# Patient Record
Sex: FEMALE | Race: WHITE | Employment: OTHER | URBAN - METROPOLITAN AREA
[De-identification: names, ages, dates, MRNs, and addresses within clinical notes are randomized per-mention and may not be internally consistent; named-entity substitution may affect disease eponyms.]

---

## 2017-01-10 ENCOUNTER — TELEPHONE (OUTPATIENT)
Dept: INTERNAL MEDICINE | Age: 62
End: 2017-01-10

## 2017-01-12 ENCOUNTER — OFFICE VISIT (OUTPATIENT)
Dept: INTERNAL MEDICINE | Age: 62
End: 2017-01-12

## 2017-01-12 VITALS — DIASTOLIC BLOOD PRESSURE: 90 MMHG | TEMPERATURE: 97.8 F | WEIGHT: 191 LBS | SYSTOLIC BLOOD PRESSURE: 170 MMHG

## 2017-01-12 DIAGNOSIS — R05.9 COUGH: Primary | ICD-10-CM

## 2017-01-12 DIAGNOSIS — J06.9 UPPER RESPIRATORY TRACT INFECTION, UNSPECIFIED TYPE: ICD-10-CM

## 2017-01-12 DIAGNOSIS — I10 ESSENTIAL HYPERTENSION: ICD-10-CM

## 2017-01-12 DIAGNOSIS — R07.9 CHEST PAIN, UNSPECIFIED TYPE: ICD-10-CM

## 2017-01-12 PROCEDURE — 93000 ELECTROCARDIOGRAM COMPLETE: CPT | Performed by: INTERNAL MEDICINE

## 2017-01-12 PROCEDURE — 99213 OFFICE O/P EST LOW 20 MIN: CPT | Performed by: INTERNAL MEDICINE

## 2017-01-12 RX ORDER — PROMETHAZINE HYDROCHLORIDE AND PHENYLEPHRINE HYDROCHLORIDE 6.25; 5 MG/5ML; MG/5ML
5 SYRUP ORAL EVERY 6 HOURS
Qty: 120 ML | Refills: 0 | Status: SHIPPED | OUTPATIENT
Start: 2017-01-12 | End: 2017-02-15 | Stop reason: ALTCHOICE

## 2017-01-12 RX ORDER — TRIAMTERENE AND HYDROCHLOROTHIAZIDE 37.5; 25 MG/1; MG/1
1 TABLET ORAL DAILY
Qty: 30 TABLET | Refills: 3 | Status: SHIPPED | OUTPATIENT
Start: 2017-01-12 | End: 2017-02-15

## 2017-01-12 ASSESSMENT — ENCOUNTER SYMPTOMS
GASTROINTESTINAL NEGATIVE: 1
SHORTNESS OF BREATH: 0
EYES NEGATIVE: 1
SORE THROAT: 1
COUGH: 1

## 2017-02-15 ENCOUNTER — OFFICE VISIT (OUTPATIENT)
Dept: INTERNAL MEDICINE | Age: 62
End: 2017-02-15

## 2017-02-15 VITALS — DIASTOLIC BLOOD PRESSURE: 78 MMHG | SYSTOLIC BLOOD PRESSURE: 130 MMHG | WEIGHT: 192 LBS

## 2017-02-15 DIAGNOSIS — I10 ESSENTIAL HYPERTENSION: Primary | ICD-10-CM

## 2017-02-15 DIAGNOSIS — G47.33 OBSTRUCTIVE SLEEP APNEA SYNDROME: ICD-10-CM

## 2017-02-15 PROCEDURE — 99213 OFFICE O/P EST LOW 20 MIN: CPT | Performed by: INTERNAL MEDICINE

## 2017-02-15 RX ORDER — LOSARTAN POTASSIUM AND HYDROCHLOROTHIAZIDE 12.5; 5 MG/1; MG/1
0.5 TABLET ORAL DAILY
Qty: 30 TABLET | Refills: 3 | Status: SHIPPED | OUTPATIENT
Start: 2017-02-15 | End: 2017-03-20 | Stop reason: SDUPTHER

## 2017-02-15 ASSESSMENT — ENCOUNTER SYMPTOMS
CHEST TIGHTNESS: 0
VOMITING: 0
SHORTNESS OF BREATH: 0
ABDOMINAL PAIN: 0
DIARRHEA: 0
COUGH: 0
NAUSEA: 0
CONSTIPATION: 0

## 2017-03-20 ENCOUNTER — TELEPHONE (OUTPATIENT)
Dept: INTERNAL MEDICINE | Age: 62
End: 2017-03-20

## 2017-03-20 RX ORDER — LOSARTAN POTASSIUM AND HYDROCHLOROTHIAZIDE 12.5; 5 MG/1; MG/1
1 TABLET ORAL DAILY
Qty: 30 TABLET | Refills: 5 | COMMUNITY
Start: 2017-03-20 | End: 2017-04-25 | Stop reason: SDUPTHER

## 2017-04-25 RX ORDER — LOSARTAN POTASSIUM AND HYDROCHLOROTHIAZIDE 12.5; 5 MG/1; MG/1
1 TABLET ORAL DAILY
Qty: 90 TABLET | Refills: 3 | Status: SHIPPED | OUTPATIENT
Start: 2017-04-25 | End: 2017-12-27 | Stop reason: SDUPTHER

## 2017-09-26 ENCOUNTER — TELEPHONE (OUTPATIENT)
Dept: INTERNAL MEDICINE | Age: 62
End: 2017-09-26

## 2017-09-26 DIAGNOSIS — Z12.31 VISIT FOR SCREENING MAMMOGRAM: Primary | ICD-10-CM

## 2017-09-26 RX ORDER — ESCITALOPRAM OXALATE 10 MG/1
TABLET ORAL
Qty: 90 TABLET | Refills: 3 | Status: SHIPPED | OUTPATIENT
Start: 2017-09-26 | End: 2017-12-27 | Stop reason: SDUPTHER

## 2017-11-22 ENCOUNTER — TELEPHONE (OUTPATIENT)
Dept: INTERNAL MEDICINE | Age: 62
End: 2017-11-22

## 2017-11-22 RX ORDER — SCOLOPAMINE TRANSDERMAL SYSTEM 1 MG/1
1 PATCH, EXTENDED RELEASE TRANSDERMAL
Qty: 5 PATCH | Refills: 0 | Status: SHIPPED | OUTPATIENT
Start: 2017-11-22 | End: 2017-11-22 | Stop reason: SDUPTHER

## 2017-11-22 RX ORDER — SCOLOPAMINE TRANSDERMAL SYSTEM 1 MG/1
1 PATCH, EXTENDED RELEASE TRANSDERMAL
Qty: 5 PATCH | Refills: 0 | Status: SHIPPED | OUTPATIENT
Start: 2017-11-22 | End: 2017-12-27 | Stop reason: CLARIF

## 2017-11-22 NOTE — TELEPHONE ENCOUNTER
Patient's  Clay Jeong called the patient will traveling to [facility] in 2 weeks and she's asking for see sick patch   [facility] on file   Please call pt  when completed

## 2017-11-22 NOTE — TELEPHONE ENCOUNTER
Pt's  asking for scopolamine (TRANSDERM-SCOP, 1.5 MG,) transdermal patch be sent to [facility] [phone] since [facility] does not have

## 2017-12-27 ENCOUNTER — OFFICE VISIT (OUTPATIENT)
Dept: INTERNAL MEDICINE | Age: 62
End: 2017-12-27

## 2017-12-27 VITALS
WEIGHT: 196 LBS | DIASTOLIC BLOOD PRESSURE: 80 MMHG | HEIGHT: 62 IN | SYSTOLIC BLOOD PRESSURE: 130 MMHG | BODY MASS INDEX: 36.07 KG/M2

## 2017-12-27 DIAGNOSIS — E78.5 HYPERLIPIDEMIA, UNSPECIFIED HYPERLIPIDEMIA TYPE: ICD-10-CM

## 2017-12-27 DIAGNOSIS — I10 ESSENTIAL HYPERTENSION: ICD-10-CM

## 2017-12-27 DIAGNOSIS — Z12.11 ENCOUNTER FOR SCREENING COLONOSCOPY: ICD-10-CM

## 2017-12-27 DIAGNOSIS — G47.33 OBSTRUCTIVE SLEEP APNEA SYNDROME: ICD-10-CM

## 2017-12-27 DIAGNOSIS — Z00.00 WELL ADULT EXAM: Primary | ICD-10-CM

## 2017-12-27 DIAGNOSIS — F32.A DEPRESSION, UNSPECIFIED DEPRESSION TYPE: ICD-10-CM

## 2017-12-27 PROCEDURE — 93000 ELECTROCARDIOGRAM COMPLETE: CPT | Performed by: INTERNAL MEDICINE

## 2017-12-27 PROCEDURE — 99396 PREV VISIT EST AGE 40-64: CPT | Performed by: INTERNAL MEDICINE

## 2017-12-27 RX ORDER — LOSARTAN POTASSIUM AND HYDROCHLOROTHIAZIDE 12.5; 5 MG/1; MG/1
1 TABLET ORAL DAILY
Qty: 90 TABLET | Refills: 3 | Status: SHIPPED | OUTPATIENT
Start: 2017-12-27 | End: 2019-03-06 | Stop reason: SDUPTHER

## 2017-12-27 RX ORDER — ESCITALOPRAM OXALATE 10 MG/1
TABLET ORAL
Qty: 90 TABLET | Refills: 3 | Status: SHIPPED | OUTPATIENT
Start: 2017-12-27 | End: 2019-03-06 | Stop reason: SDUPTHER

## 2017-12-27 ASSESSMENT — PATIENT HEALTH QUESTIONNAIRE - PHQ9
SUM OF ALL RESPONSES TO PHQ QUESTIONS 1-9: 0
1. LITTLE INTEREST OR PLEASURE IN DOING THINGS: 0
SUM OF ALL RESPONSES TO PHQ9 QUESTIONS 1 & 2: 0
2. FEELING DOWN, DEPRESSED OR HOPELESS: 0

## 2017-12-27 NOTE — PROGRESS NOTES
Annual Wellness Visit     Patient:  Carlota Raines                                               : 1955  Age: 65 y.o. MRN: [id number]  Date : 2017      CHIEF COMPLAINT: Carlota Raines is a 65 y.o. female who presents for : Physical exam    1. Well adult exam  Gen. he feels okay does have a problem with pain in her knee denies any chest pain shortness of breath or any other problems  - EKG 12 lead  - CBC Auto Differential  - Comprehensive Metabolic Panel  - Lipid Panel  - TSH without Reflex  - Urinalysis    2. Essential hypertension  Problem is stable  - EKG 12 lead  - CBC Auto Differential  - Comprehensive Metabolic Panel  - Lipid Panel  - TSH without Reflex  - Urinalysis    3. Hyperlipidemia, unspecified hyperlipidemia type  From is stable no myalgias follows her diet  - EKG 12 lead  - CBC Auto Differential  - Comprehensive Metabolic Panel  - Lipid Panel  - TSH without Reflex  - Urinalysis    4. Depression, unspecified depression type  Problem is stable on present meds    5. Obstructive sleep apnea syndrome  Is to wear CPAP    6.  Encounter for screening colonoscopy    - Corey Frankel MD (formerly [facility])        Patient Active Problem List    Diagnosis Date Noted    Essential hypertension 2017    EKG abnormalities 2016    Well adult exam 2014    CTS (carpal tunnel syndrome) 2014    Premature ovarian failure 2014    Depression 2013    HTN (hypertension) 2013    Sleep apnea 2013    Hyperlipidemia 2013       Constitutional:  Denies fever or chills   Eyes:  Denies change in visual acuity   HENT:  Denies nasal congestion or sore throat   Respiratory:  Denies cough or shortness of breath   Cardiovascular:  Denies chest pain or edema   GI:  Denies abdominal pain, nausea, vomiting, bloody stools or diarrhea   :  Denies dysuria   Musculoskeletal:  Denies back pain or joint pain   Integument:  Denies rash   Neurologic:  Denies headache, focal weakness or sensory changes   Endocrine:  Denies polyuria or polydipsia   Lymphatic:  Denies swollen glands   Psychiatric:  Denies depression or anxiety     Past Medical History:        Diagnosis Date    CTS (carpal tunnel syndrome) 5/29/2014    Depression 1/31/2013    EKG abnormalities 8/30/2016    HTN (hypertension) 1/31/2013    Premature ovarian failure 5/29/2014    Sleep apnea 1/31/2013       Past Surgical History:        Procedure Laterality Date    CARPAL TUNNEL RELEASE      COLONOSCOPY           Allergies:  Bee venom and Pcn [penicillins]    Current Medications:    Prior to Admission medications    Medication Sig Start Date End Date Taking? Authorizing Provider   escitalopram (LEXAPRO) 10 MG tablet TAKE 1 TABLET DAILY 12/27/17  Yes Lindsay Oconnor MD   losartan-hydrochlorothiazide [facility]) 50-12.5 MG per tablet Take 1 tablet by mouth daily 12/27/17  Yes Lindsay Oconnor MD   vitamin D (CHOLECALCIFEROL) 1000 UNIT TABS tablet Take 1,000 Units by mouth daily   Yes Historical Provider, MD   calcium carbonate (OSCAL) 500 MG TABS tablet Take 500 mg by mouth daily   Yes Historical Provider, MD   ascorbic acid (VITAMIN C) 500 MG tablet Take 1,000 mg by mouth daily   Yes Historical Provider, MD           Physical Exam:      Constitutional:  Well developed, well nourished, no acute distress, non-toxic appearance   Eyes:  PERRL, conjunctiva normal   HENT:  Atraumatic, external ears normal, nose normal, oropharynx moist, no pharyngeal exudates. Neck- normal range of motion, no tenderness, supple   Respiratory:  No respiratory distress, normal breath sounds, no rales, no wheezing   Cardiovascular:  Normal rate, normal rhythm, no murmurs, no gallops, no rubs   GI:  Soft, nondistended, normal bowel sounds, nontender, no organomegaly, no mass, no rebound, no guarding   :  No costovertebral angle tenderness   Musculoskeletal:  No edema, no tenderness, no deformities.  Back- no tenderness  Integument:  Well hydrated, no rash Comprehensive Metabolic Panel  - Lipid Panel  - TSH without Reflex  - Urinalysis    3. Hyperlipidemia, unspecified hyperlipidemia type  Check above labs continue present meds for now follow diet  - EKG 12 lead  - CBC Auto Differential  - Comprehensive Metabolic Panel  - Lipid Panel  - TSH without Reflex  - Urinalysis    4. Depression, unspecified depression type  Problem is stable on current meds    5. Obstructive sleep apnea syndrome  Problem is stable continue CPAP    6.  Encounter for screening colonoscopy  Referral for screening colonoscopy  - Sophia Dakins MD ([facility])   Patient was also given exercise program for chondromalacia of the left knee

## 2018-01-03 LAB
A/G RATIO: 2.1 (ref 1.1–2.2)
ALBUMIN SERPL-MCNC: 4.6 G/DL (ref 3.4–5)
ALP BLD-CCNC: 69 U/L (ref 40–129)
ALT SERPL-CCNC: 33 U/L (ref 10–40)
ANION GAP SERPL CALCULATED.3IONS-SCNC: 13 MMOL/L (ref 3–16)
AST SERPL-CCNC: 40 U/L (ref 15–37)
BASOPHILS ABSOLUTE: 0 K/UL (ref 0–0.2)
BASOPHILS RELATIVE PERCENT: 0.7 %
BILIRUB SERPL-MCNC: 0.3 MG/DL (ref 0–1)
BILIRUBIN URINE: NEGATIVE
BLOOD, URINE: NEGATIVE
BUN BLDV-MCNC: 14 MG/DL (ref 7–20)
CALCIUM SERPL-MCNC: 9.9 MG/DL (ref 8.3–10.6)
CHLORIDE BLD-SCNC: 98 MMOL/L (ref 99–110)
CHOLESTEROL, TOTAL: 186 MG/DL (ref 0–199)
CLARITY: CLEAR
CO2: 30 MMOL/L (ref 21–32)
COLOR: YELLOW
CREAT SERPL-MCNC: 0.9 MG/DL (ref 0.6–1.2)
EOSINOPHILS ABSOLUTE: 0.3 K/UL (ref 0–0.6)
EOSINOPHILS RELATIVE PERCENT: 3.9 %
EPITHELIAL CELLS, UA: 7 /HPF (ref 0–5)
GFR AFRICAN AMERICAN: >60
GFR NON-AFRICAN AMERICAN: >60
GLOBULIN: 2.2 G/DL
GLUCOSE BLD-MCNC: 113 MG/DL (ref 70–99)
GLUCOSE URINE: NEGATIVE MG/DL
HCT VFR BLD CALC: 41.3 % (ref 36–48)
HDLC SERPL-MCNC: 50 MG/DL (ref 40–60)
HEMOGLOBIN: 13.8 G/DL (ref 12–16)
HYALINE CASTS: 3 /LPF (ref 0–8)
KETONES, URINE: NEGATIVE MG/DL
LDL CHOLESTEROL CALCULATED: 107 MG/DL
LEUKOCYTE ESTERASE, URINE: ABNORMAL
LYMPHOCYTES ABSOLUTE: 0.8 K/UL (ref 1–5.1)
LYMPHOCYTES RELATIVE PERCENT: 11.1 %
MCH RBC QN AUTO: 29.9 PG (ref 26–34)
MCHC RBC AUTO-ENTMCNC: 33.4 G/DL (ref 31–36)
MCV RBC AUTO: 89.7 FL (ref 80–100)
MICROSCOPIC EXAMINATION: YES
MONOCYTES ABSOLUTE: 0.5 K/UL (ref 0–1.3)
MONOCYTES RELATIVE PERCENT: 7.8 %
NEUTROPHILS ABSOLUTE: 5.3 K/UL (ref 1.7–7.7)
NEUTROPHILS RELATIVE PERCENT: 76.5 %
NITRITE, URINE: NEGATIVE
PDW BLD-RTO: 13.3 % (ref 12.4–15.4)
PH UA: 5.5
PLATELET # BLD: 222 K/UL (ref 135–450)
PMV BLD AUTO: 10.4 FL (ref 5–10.5)
POTASSIUM SERPL-SCNC: 4.5 MMOL/L (ref 3.5–5.1)
PROTEIN UA: NEGATIVE MG/DL
RBC # BLD: 4.61 M/UL (ref 4–5.2)
RBC UA: 4 /HPF (ref 0–4)
SODIUM BLD-SCNC: 141 MMOL/L (ref 136–145)
SPECIFIC GRAVITY UA: 1.02
TOTAL PROTEIN: 6.8 G/DL (ref 6.4–8.2)
TRIGL SERPL-MCNC: 147 MG/DL (ref 0–150)
TSH SERPL DL<=0.05 MIU/L-ACNC: 1.83 UIU/ML (ref 0.27–4.2)
URINE TYPE: ABNORMAL
UROBILINOGEN, URINE: 0.2 E.U./DL
VLDLC SERPL CALC-MCNC: 29 MG/DL
WBC # BLD: 6.9 K/UL (ref 4–11)
WBC UA: 25 /HPF (ref 0–5)

## 2018-01-04 DIAGNOSIS — R73.9 ELEVATED BLOOD SUGAR: ICD-10-CM

## 2018-01-04 DIAGNOSIS — R73.09 ELEVATED GLUCOSE: Primary | ICD-10-CM

## 2018-01-05 PROBLEM — R73.9 HYPERGLYCEMIA: Status: ACTIVE | Noted: 2018-01-05

## 2018-01-05 LAB
ESTIMATED AVERAGE GLUCOSE: 137 MG/DL
HBA1C MFR BLD: 6.4 %

## 2018-01-22 ENCOUNTER — HOSPITAL ENCOUNTER (OUTPATIENT)
Dept: MAMMOGRAPHY | Age: 63
Discharge: HOME | End: 2018-01-22
Attending: OBSTETRICS & GYNECOLOGY | Admitting: OBSTETRICS & GYNECOLOGY

## 2018-01-22 DIAGNOSIS — Z12.31 VISIT FOR SCREENING MAMMOGRAM: ICD-10-CM

## 2018-06-19 ENCOUNTER — OFFICE VISIT (OUTPATIENT)
Dept: DERMATOLOGY | Age: 63
End: 2018-06-19

## 2018-06-19 DIAGNOSIS — M71.30 MYXOID CYST: Primary | ICD-10-CM

## 2018-06-19 DIAGNOSIS — L91.8 CUTANEOUS SKIN TAGS: ICD-10-CM

## 2018-06-19 PROCEDURE — 99201 PR OFFICE OUTPATIENT NEW 10 MINUTES: CPT | Performed by: DERMATOLOGY

## 2018-06-19 PROCEDURE — 11200 RMVL SKIN TAGS UP TO&INC 15: CPT | Performed by: DERMATOLOGY

## 2018-06-19 RX ORDER — LOSARTAN POTASSIUM 100 MG/1
50 TABLET ORAL DAILY
COMMUNITY
End: 2019-03-06 | Stop reason: ALTCHOICE

## 2018-06-19 RX ORDER — ESCITALOPRAM OXALATE 20 MG/1
10 TABLET ORAL DAILY
COMMUNITY
End: 2019-03-06 | Stop reason: DRUGHIGH

## 2018-07-16 ENCOUNTER — PROCEDURE VISIT (OUTPATIENT)
Dept: SURGERY | Age: 63
End: 2018-07-16

## 2018-07-16 VITALS
WEIGHT: 186 LBS | SYSTOLIC BLOOD PRESSURE: 136 MMHG | OXYGEN SATURATION: 97 % | DIASTOLIC BLOOD PRESSURE: 72 MMHG | BODY MASS INDEX: 34.02 KG/M2 | TEMPERATURE: 98.1 F

## 2018-07-16 DIAGNOSIS — M71.30 MYXOID CYST: Primary | ICD-10-CM

## 2018-07-16 PROCEDURE — 11422 EXC H-F-NK-SP B9+MARG 1.1-2: CPT | Performed by: DERMATOLOGY

## 2018-07-16 RX ORDER — METHYLPREDNISOLONE 4 MG/1
TABLET ORAL
COMMUNITY
Start: 2018-07-12 | End: 2019-04-03 | Stop reason: CLARIF

## 2018-07-16 NOTE — PROGRESS NOTES
PRE-PROCEDURE SCREENING    Pacemaker/ICD: No  Difficulty with numbing in the past: No  Local Anesthesia Reaction/passing out: No  Latex or adhesive allergy:  No  Bleeding/Clotting Disorders: No  Anticoagulant Therapy: No  Joint prosthesis: No  Artificial Heart Valve: No  Stroke or Seizures: No  Organ Transplant or Lymphoma: No  Immunosuppression: No  Respiratory Problems: No

## 2018-07-16 NOTE — PROGRESS NOTES
EXCISION OPERATIVE PROCEDURE NOTE    SURGEON: Ab Ibrahim MD    ASSISTANT:  Saba Huitron RN     REFERRING PROVIDER:   Payal Gomez MD    PREOPERATIVE DIAGNOSIS: Rule out Myxoid Cyst    POSTOPERATIVE DIAGNOSIS: SAME. OPERATIVE PROCEDURE: EXCISION    RECONSTRUCTION OF DEFECT: simple repair    LOCATION: Left index finger     SIZE OF LESION: 12x8 MM     FINAL REPAIR LENGTH:  12x8 MM    ANESTHESIA:1.0 CC XYLOCAINE 1% WITH EPINEPHRINE 1:100,000, BUFFERED. DURATION OF PROCEDURE: 30 MINUTES     POSTOPERATIVE OBSERVATION: 30 MINUTES     EBL: MINIMAL. SPECIMENS: 1    COMPLICATIONS: NONE     DESCRIPTION OF PROCEDURE: The patient was given a mirror and the lesion site was identified, marked with surgical marking pen, and verified with the patient. Written consent was obtained including a discussion of the high rate of recurrence of these types of cysts. There was a time out for person and procedure verification. The operative site was cleansed with Chlorhexidine gluconate 4% solution, then cleaned off, dried, and draped sterilely. The lesion was excised via an overlying arciform design. A flap of skin was reflected distally and the cyst was identified and removed. Electrocautery to the base. The flap of skin was then placed back into position and sutured into place with 5-0 fast absorbing gut interrupted sutures and then covered with liquid skin adhesive for additional adhesion and barrier protection. A dressing was applied for stabilization and light pressure. The patient was given detailed oral and written instructions on postoperative care. There were no complications. The patient left the Unit in good medical condition and was scheduled to return for suture removal/wound check as needed.

## 2018-07-19 ENCOUNTER — TELEPHONE (OUTPATIENT)
Dept: SURGERY | Age: 63
End: 2018-07-19

## 2018-07-24 ENCOUNTER — TELEPHONE (OUTPATIENT)
Dept: SURGERY | Age: 63
End: 2018-07-24

## 2018-07-24 NOTE — TELEPHONE ENCOUNTER
Patient was concerned that the glue was still present. She stated that the area is purple which is the surgical marker that was used. Advised her to start washing the are gently and the glue will start lifting. Picture was sent. Photo was blurry but there are no indications of complications. Not warm to the touch, not painful or bleeding. Patient will call if there are any further changes.

## 2019-01-14 RX ORDER — LOSARTAN POTASSIUM AND HYDROCHLOROTHIAZIDE 12.5; 5 MG/1; MG/1
TABLET ORAL
Qty: 90 TABLET | Refills: 3 | OUTPATIENT
Start: 2019-01-14

## 2019-03-06 RX ORDER — ESCITALOPRAM OXALATE 10 MG/1
TABLET ORAL
Qty: 90 TABLET | Refills: 3 | Status: SHIPPED | OUTPATIENT
Start: 2019-03-06 | End: 2020-04-01 | Stop reason: SDUPTHER

## 2019-03-06 RX ORDER — LOSARTAN POTASSIUM AND HYDROCHLOROTHIAZIDE 12.5; 5 MG/1; MG/1
1 TABLET ORAL DAILY
Qty: 90 TABLET | Refills: 3 | Status: SHIPPED | OUTPATIENT
Start: 2019-03-06 | End: 2020-02-14

## 2019-04-03 ENCOUNTER — OFFICE VISIT (OUTPATIENT)
Dept: INTERNAL MEDICINE CLINIC | Age: 64
End: 2019-04-03
Payer: COMMERCIAL

## 2019-04-03 VITALS
SYSTOLIC BLOOD PRESSURE: 132 MMHG | WEIGHT: 195 LBS | DIASTOLIC BLOOD PRESSURE: 68 MMHG | BODY MASS INDEX: 35.88 KG/M2 | HEIGHT: 62 IN

## 2019-04-03 DIAGNOSIS — I10 ESSENTIAL HYPERTENSION: ICD-10-CM

## 2019-04-03 DIAGNOSIS — E78.5 HYPERLIPIDEMIA, UNSPECIFIED HYPERLIPIDEMIA TYPE: ICD-10-CM

## 2019-04-03 DIAGNOSIS — Z00.00 WELL ADULT EXAM: Primary | ICD-10-CM

## 2019-04-03 DIAGNOSIS — L30.9 ECZEMA, UNSPECIFIED TYPE: ICD-10-CM

## 2019-04-03 DIAGNOSIS — F32.A DEPRESSION, UNSPECIFIED DEPRESSION TYPE: ICD-10-CM

## 2019-04-03 DIAGNOSIS — Z23 NEED FOR SHINGLES VACCINE: ICD-10-CM

## 2019-04-03 DIAGNOSIS — G47.33 OBSTRUCTIVE SLEEP APNEA SYNDROME: ICD-10-CM

## 2019-04-03 PROCEDURE — 90750 HZV VACC RECOMBINANT IM: CPT | Performed by: INTERNAL MEDICINE

## 2019-04-03 PROCEDURE — 93000 ELECTROCARDIOGRAM COMPLETE: CPT | Performed by: INTERNAL MEDICINE

## 2019-04-03 PROCEDURE — 90471 IMMUNIZATION ADMIN: CPT | Performed by: INTERNAL MEDICINE

## 2019-04-03 PROCEDURE — 99396 PREV VISIT EST AGE 40-64: CPT | Performed by: INTERNAL MEDICINE

## 2019-04-03 RX ORDER — TRIAMCINOLONE ACETONIDE 1 MG/G
CREAM TOPICAL
Qty: 1 TUBE | Refills: 1 | Status: SHIPPED | OUTPATIENT
Start: 2019-04-03 | End: 2019-05-03

## 2019-04-03 NOTE — PROGRESS NOTES
Annual Wellness Visit     Patient:  Stacy Alvarez                                               : 1955  Age: 59 y.o. MRN: [id number]  Date : 4/3/2019      CHIEF COMPLAINT: Stacy Alvarez is a 59 y.o. female who presents for : Physical exam    1. Well adult exam  Generally feels okay does have a rash that, worse with fever is somewhat pruritic mainly of her neck she's had rash is similar but never on his neck area otherwise she's been feeling fine denies any significant exertional chest pain shortness of breath or any other problems  - EKG 12 Lead  - Internal Referral to Dietitian  - CBC Auto Differential; Future  - Comprehensive Metabolic Panel; Future  - Lipid Panel; Future  - TSH without Reflex; Future  - Urinalysis; Future  - Hemoglobin A1C; Future    2. Depression, unspecified depression type  Problem is stable    3. Essential hypertension  Problem is stable    4. Obstructive sleep apnea syndrome  Currently uses a CPAP and was recalibrated last year    5. Hyperlipidemia, unspecified hyperlipidemia type  No complaints no myalgias    6.  Eczema, unspecified type  As above        Patient Active Problem List    Diagnosis Date Noted    Myxoid cyst 2018    Hyperglycemia 2018    Essential hypertension 2017    EKG abnormalities 2016    CTS (carpal tunnel syndrome) 2014    Premature ovarian failure 2014    Depression 2013    Sleep apnea 2013    Hyperlipidemia 2013       Constitutional:  Denies fever or chills   Eyes:  Denies change in visual acuity   HENT:  Denies nasal congestion or sore throat   Respiratory:  Denies cough or shortness of breath   Cardiovascular:  Denies chest pain or edema   GI:  Denies abdominal pain, nausea, vomiting, bloody stools or diarrhea   :  Denies dysuria   Musculoskeletal:  Denies back pain or joint pain   Integument:  Denies rash   Neurologic:  Denies headache, focal weakness or sensory changes   Endocrine:  Denies Medication Sig Start Date End Date Taking? Authorizing Provider   triamcinolone (KENALOG) 0.1 % cream Apply topically 2 times daily. 4/3/19 5/3/19 Yes Inocente Beaver MD   escitalopram (LEXAPRO) 10 MG tablet TAKE 1 TABLET DAILY 3/6/19  Yes Inocente Beaver MD   losartan-hydrochlorothiazide [facility]) 50-12.5 MG per tablet Take 1 tablet by mouth daily 3/6/19  Yes Inocente Beaver MD   vitamin D (CHOLECALCIFEROL) 1000 UNIT TABS tablet Take 1,000 Units by mouth daily   Yes Historical Provider, MD   calcium carbonate (OSCAL) 500 MG TABS tablet Take 500 mg by mouth daily   Yes Historical Provider, MD   ascorbic acid (VITAMIN C) 500 MG tablet Take 1,000 mg by mouth daily   Yes Historical Provider, MD           Physical Exam:      Constitutional:  Well developed, overweight, no acute distress, non-toxic appearance   Eyes:  PERRL, conjunctiva normal   HENT:  Atraumatic, external ears normal, nose normal, oropharynx moist, no pharyngeal exudates. Neck- normal range of motion, no tenderness, supple   Respiratory:  No respiratory distress, normal breath sounds, no rales, no wheezing   Cardiovascular:  Normal rate, normal rhythm, no murmurs, no gallops, no rubs   GI:  Soft, nondistended, normal bowel sounds, nontender, no organomegaly, no mass, no rebound, no guarding   :  No costovertebral angle tenderness   Musculoskeletal:  No edema, no tenderness, no deformities. Back- no tenderness  Integument:  Well hydrated, erythematous hyperkeratotic rash over her neck bilaterally   Lymphatic:  No lymphadenopathy noted   Neurologic:  Alert & oriented x 3, CN 2-12 normal, normal motor function, normal sensory function, no focal deficits noted   Psychiatric:  Speech and behavior appropriate       Vitals: /68   Ht 5' 2\" (1.575 m)   Wt 195 lb (88.5 kg)   BMI 35.67 kg/m²     Body mass index is 35.67 kg/m².   Wt Readings from Last 3 Encounters:   04/03/19 195 lb (88.5 kg)   07/16/18 186 lb (84.4 kg)   12/27/17 196 lb (88.9 kg)

## 2019-04-15 DIAGNOSIS — Z00.00 WELL ADULT EXAM: ICD-10-CM

## 2019-04-15 LAB
A/G RATIO: 1.7 (ref 1.1–2.2)
ALBUMIN SERPL-MCNC: 4.5 G/DL (ref 3.4–5)
ALP BLD-CCNC: 75 U/L (ref 40–129)
ALT SERPL-CCNC: 30 U/L (ref 10–40)
ANION GAP SERPL CALCULATED.3IONS-SCNC: 13 MMOL/L (ref 3–16)
AST SERPL-CCNC: 41 U/L (ref 15–37)
BASOPHILS ABSOLUTE: 0 K/UL (ref 0–0.2)
BASOPHILS RELATIVE PERCENT: 0.7 %
BILIRUB SERPL-MCNC: <0.2 MG/DL (ref 0–1)
BILIRUBIN URINE: NEGATIVE
BLOOD, URINE: NEGATIVE
BUN BLDV-MCNC: 16 MG/DL (ref 7–20)
CALCIUM SERPL-MCNC: 10.2 MG/DL (ref 8.3–10.6)
CHLORIDE BLD-SCNC: 102 MMOL/L (ref 99–110)
CHOLESTEROL, TOTAL: 196 MG/DL (ref 0–199)
CLARITY: CLEAR
CO2: 26 MMOL/L (ref 21–32)
COLOR: YELLOW
CREAT SERPL-MCNC: 1 MG/DL (ref 0.6–1.2)
EOSINOPHILS ABSOLUTE: 0.4 K/UL (ref 0–0.6)
EOSINOPHILS RELATIVE PERCENT: 5.5 %
ESTIMATED AVERAGE GLUCOSE: 128.4 MG/DL
GFR AFRICAN AMERICAN: >60
GFR NON-AFRICAN AMERICAN: 56
GLOBULIN: 2.6 G/DL
GLUCOSE BLD-MCNC: 124 MG/DL (ref 70–99)
GLUCOSE URINE: NEGATIVE MG/DL
HBA1C MFR BLD: 6.1 %
HCT VFR BLD CALC: 40.5 % (ref 36–48)
HDLC SERPL-MCNC: 48 MG/DL (ref 40–60)
HEMOGLOBIN: 13.7 G/DL (ref 12–16)
KETONES, URINE: NEGATIVE MG/DL
LDL CHOLESTEROL CALCULATED: 113 MG/DL
LEUKOCYTE ESTERASE, URINE: NEGATIVE
LYMPHOCYTES ABSOLUTE: 1.1 K/UL (ref 1–5.1)
LYMPHOCYTES RELATIVE PERCENT: 15.8 %
MCH RBC QN AUTO: 30.3 PG (ref 26–34)
MCHC RBC AUTO-ENTMCNC: 33.9 G/DL (ref 31–36)
MCV RBC AUTO: 89.4 FL (ref 80–100)
MICROSCOPIC EXAMINATION: NORMAL
MONOCYTES ABSOLUTE: 0.5 K/UL (ref 0–1.3)
MONOCYTES RELATIVE PERCENT: 7.8 %
NEUTROPHILS ABSOLUTE: 4.8 K/UL (ref 1.7–7.7)
NEUTROPHILS RELATIVE PERCENT: 70.2 %
NITRITE, URINE: NEGATIVE
PDW BLD-RTO: 13.5 % (ref 12.4–15.4)
PH UA: 6 (ref 5–8)
PLATELET # BLD: 256 K/UL (ref 135–450)
PMV BLD AUTO: 9.7 FL (ref 5–10.5)
POTASSIUM SERPL-SCNC: 4.3 MMOL/L (ref 3.5–5.1)
PROTEIN UA: NEGATIVE MG/DL
RBC # BLD: 4.53 M/UL (ref 4–5.2)
SODIUM BLD-SCNC: 141 MMOL/L (ref 136–145)
SPECIFIC GRAVITY UA: 1.02 (ref 1–1.03)
TOTAL PROTEIN: 7.1 G/DL (ref 6.4–8.2)
TRIGL SERPL-MCNC: 176 MG/DL (ref 0–150)
TSH SERPL DL<=0.05 MIU/L-ACNC: 2.73 UIU/ML (ref 0.27–4.2)
URINE TYPE: NORMAL
UROBILINOGEN, URINE: 0.2 E.U./DL
VLDLC SERPL CALC-MCNC: 35 MG/DL
WBC # BLD: 6.8 K/UL (ref 4–11)

## 2020-02-14 RX ORDER — LOSARTAN POTASSIUM AND HYDROCHLOROTHIAZIDE 12.5; 5 MG/1; MG/1
TABLET ORAL
Qty: 90 TABLET | Refills: 0 | Status: SHIPPED | OUTPATIENT
Start: 2020-02-14 | End: 2020-05-12

## 2020-04-01 RX ORDER — ESCITALOPRAM OXALATE 10 MG/1
TABLET ORAL
Qty: 90 TABLET | Refills: 3 | Status: SHIPPED | OUTPATIENT
Start: 2020-04-01 | End: 2020-08-24 | Stop reason: SDUPTHER

## 2020-05-12 RX ORDER — LOSARTAN POTASSIUM AND HYDROCHLOROTHIAZIDE 12.5; 5 MG/1; MG/1
TABLET ORAL
Qty: 90 TABLET | Refills: 2 | Status: SHIPPED | OUTPATIENT
Start: 2020-05-12 | End: 2020-08-24 | Stop reason: SDUPTHER

## 2020-08-24 ENCOUNTER — TELEPHONE (OUTPATIENT)
Dept: INTERNAL MEDICINE CLINIC | Age: 65
End: 2020-08-24

## 2020-08-24 RX ORDER — ESCITALOPRAM OXALATE 10 MG/1
TABLET ORAL
Qty: 90 TABLET | Refills: 1 | Status: SHIPPED | OUTPATIENT
Start: 2020-08-24 | End: 2021-04-15

## 2020-08-24 RX ORDER — LOSARTAN POTASSIUM AND HYDROCHLOROTHIAZIDE 12.5; 5 MG/1; MG/1
TABLET ORAL
Qty: 90 TABLET | Refills: 1 | Status: SHIPPED | OUTPATIENT
Start: 2020-08-24 | End: 2021-02-24

## 2020-08-24 NOTE — TELEPHONE ENCOUNTER
Med refill request    losartan-hydrochlorothiazide (HYZAAR) 50-12.5 MG per tablet     escitalopram (LEXAPRO) 10 MG tablet     [facility] OMALLEYAt[facility] Health WaxhawRUIZ [address] [phone]

## 2020-09-16 ENCOUNTER — OFFICE VISIT (OUTPATIENT)
Dept: INTERNAL MEDICINE CLINIC | Age: 65
End: 2020-09-16
Payer: COMMERCIAL

## 2020-09-16 VITALS
WEIGHT: 182 LBS | TEMPERATURE: 98 F | DIASTOLIC BLOOD PRESSURE: 73 MMHG | BODY MASS INDEX: 33.49 KG/M2 | HEIGHT: 62 IN | SYSTOLIC BLOOD PRESSURE: 133 MMHG

## 2020-09-16 PROCEDURE — 93000 ELECTROCARDIOGRAM COMPLETE: CPT | Performed by: INTERNAL MEDICINE

## 2020-09-16 PROCEDURE — 99396 PREV VISIT EST AGE 40-64: CPT | Performed by: INTERNAL MEDICINE

## 2020-09-16 PROCEDURE — 90471 IMMUNIZATION ADMIN: CPT | Performed by: INTERNAL MEDICINE

## 2020-09-16 PROCEDURE — 90686 IIV4 VACC NO PRSV 0.5 ML IM: CPT | Performed by: INTERNAL MEDICINE

## 2020-09-16 NOTE — PROGRESS NOTES
Annual Wellness Visit     Patient:  Aakash Rendon                                               : 1955  Age: 62 y.o. MRN: <[id number]>  Date : 2020      CHIEF COMPLAINT: Aakash Rendon is a 62 y.o. female who presents for : Physical exam    1. Well adult exam  Family feels good he has lost weight has been exercising regularly denies any chest pain shortness of breath or any other problems  - EKG 12 Lead  - CBC Auto Differential; Future  - Comprehensive Metabolic Panel; Future  - Lipid Panel; Future  - TSH without Reflex; Future  - Urinalysis; Future    2. Hyperlipidemia, unspecified hyperlipidemia type  Is continue to watch her diet and lose weight exercise regularly  - EKG 12 Lead  - CBC Auto Differential; Future  - Comprehensive Metabolic Panel; Future  - Lipid Panel; Future  - TSH without Reflex; Future  - Urinalysis; Future    3. Essential hypertension  This problem is stable  - EKG 12 Lead  - CBC Auto Differential; Future  - Comprehensive Metabolic Panel; Future  - Lipid Panel; Future  - TSH without Reflex; Future  - Urinalysis; Future    4. Hyperglycemia  No symptoms of polyuria polydipsia polyphagia    5. Obstructive sleep apnea syndrome  Uses a AutoPap machine and does well    6. Depression, unspecified depression type  This problem is stable    7. EKG abnormalities  No cardiac symptoms include chest pain shortness of breath or any other problems she is biking up to 18 miles at a time  - CBC Auto Differential; Future  - Comprehensive Metabolic Panel; Future  - Lipid Panel; Future  - TSH without Reflex; Future  - Urinalysis;  Future        Patient Active Problem List    Diagnosis Date Noted    Myxoid cyst 2018    Hyperglycemia 2018    Essential hypertension 2017    EKG abnormalities 2016    CTS (carpal tunnel syndrome) 2014    Premature ovarian failure 2014    Depression 2013    Sleep apnea 2013    Hyperlipidemia 2013 Constitutional:  Denies fever or chills   Eyes:  Denies change in visual acuity   HENT:  Denies nasal congestion or sore throat   Respiratory:  Denies cough or shortness of breath   Cardiovascular:  Denies chest pain or edema   GI:  Denies abdominal pain, nausea, vomiting, bloody stools or diarrhea   :  Denies dysuria   Musculoskeletal:  Denies back pain or joint pain   Integument:  Denies rash   Neurologic:  Denies headache, focal weakness or sensory changes   Endocrine:  Denies polyuria or polydipsia   Lymphatic:  Denies swollen glands   Psychiatric:  Denies depression or anxiety     Past Medical History:        Diagnosis Date    CTS (carpal tunnel syndrome) 5/29/2014    Depression 1/31/2013    EKG abnormalities 8/30/2016    HTN (hypertension) 1/31/2013    Hyperglycemia 1/5/2018    Premature ovarian failure 5/29/2014    Sleep apnea 1/31/2013       Past Surgical History:        Procedure Laterality Date    CARPAL TUNNEL RELEASE      COLONOSCOPY         Family History:  No family history on file. Social History:  Social History     Socioeconomic History    Marital status:      Spouse name: None    Number of children: None    Years of education: None    Highest education level: None   Occupational History    None   Social Needs    Financial resource strain: None    Food insecurity     Worry: None     Inability: None    Transportation needs     Medical: None     Non-medical: None   Tobacco Use    Smoking status: Never Smoker    Smokeless tobacco: Never Used   Substance and Sexual Activity    Alcohol use:  Yes     Alcohol/week: 0.8 standard drinks     Types: 1 Standard drinks or equivalent per week    Drug use: None    Sexual activity: None   Lifestyle    Physical activity     Days per week: None     Minutes per session: None    Stress: None   Relationships    Social connections     Talks on phone: None     Gets together: None     Attends [employer] service: None     Active member of club or organization: None     Attends meetings of clubs or organizations: None     Relationship status: None    Intimate partner violence     Fear of current or ex partner: None     Emotionally abused: None     Physically abused: None     Forced sexual activity: None   Other Topics Concern    None   Social History Narrative    ** Merged History Encounter **              Allergies:  Bee venom; Pcn [penicillins]; and Penicillins    Current Medications:    Prior to Admission medications    Medication Sig Start Date End Date Taking? Authorizing Provider   losartan-hydroCHLOROthiazide (HYZAAR) 50-12.5 MG per tablet TAKE 1 TABLET DAILY 8/24/20  Yes Ila Strickland MD   escitalopram (LEXAPRO) 10 MG tablet TAKE 1 TABLET DAILY 8/24/20  Yes Ila Strickland MD   vitamin D (CHOLECALCIFEROL) 1000 UNIT TABS tablet Take 1,000 Units by mouth daily   Yes Historical Provider, MD   calcium carbonate (OSCAL) 500 MG TABS tablet Take 500 mg by mouth daily   Yes Historical Provider, MD   ascorbic acid (VITAMIN C) 500 MG tablet Take 1,000 mg by mouth daily   Yes Historical Provider, MD           Physical Exam:      Constitutional:  Well developed, overweight, no acute distress, non-toxic appearance   Eyes:  PERRL, conjunctiva normal   HENT:  Atraumatic, external ears normal, nose normal, oropharynx moist, no pharyngeal exudates. Neck- normal range of motion, no tenderness, supple   Respiratory:  No respiratory distress, normal breath sounds, no rales, no wheezing   Cardiovascular:  Normal rate, normal rhythm, no murmurs, no gallops, no rubs   GI:  Soft, nondistended, normal bowel sounds, nontender, no organomegaly, no mass, no rebound, no guarding   :  No costovertebral angle tenderness   Musculoskeletal:  No edema, no tenderness, no deformities.  Back- no tenderness  Integument:  Well hydrated, no rash   Lymphatic:  No lymphadenopathy noted   Neurologic:  Alert & oriented x 3, CN 2-12 normal, normal motor function, normal sensory function, no focal deficits noted   Psychiatric:  Speech and behavior appropriate       Vitals: /73   Temp 98 °F (36.7 °C)   Ht 5' 2\" (1.575 m)   Wt 182 lb (82.6 kg)   BMI 33.29 kg/m²     Body mass index is 33.29 kg/m². Wt Readings from Last 3 Encounters:   09/16/20 182 lb (82.6 kg)   04/03/19 195 lb (88.5 kg)   07/16/18 186 lb (84.4 kg)         LABS:    CBC:   Lab Results   Component Value Date    WBC 6.8 04/15/2019    HGB 13.7 04/15/2019    HCT 40.5 04/15/2019    MCV 89.4 04/15/2019     04/15/2019         No results found for: IRON, TIBC, FERRITIN, FOLATE, OXACMRVX87, PTH                                                          BMP:    Lab Results   Component Value Date     04/15/2019    K 4.3 04/15/2019     04/15/2019    CO2 26 04/15/2019       LFT's:   Lab Results   Component Value Date    ALT 30 04/15/2019    AST 41 (H) 04/15/2019    GGT 27 05/29/2014    ALKPHOS 75 04/15/2019    BILITOT <0.2 04/15/2019       Lipids:   Lab Results   Component Value Date    CHOL 196 04/15/2019    HDL 48 04/15/2019    LDLCALC 113 (H) 04/15/2019    TRIG 176 (H) 04/15/2019       INR: No results found for: INR, PROTIME    U/A:  Lab Results   Component Value Date    LABMICR Not Indicated 04/15/2019          Lab Results   Component Value Date    LABA1C 6.1 04/15/2019        Lab Results   Component Value Date    CREATININE 1.0 04/15/2019       -----------------------------------------------------------------     Assessment/Plan:   1. Well adult exam  Check screening labs continue diet and exercise she is to get a flu shot now and a second shingles shot in a week  - EKG 12 Lead  - CBC Auto Differential; Future  - Comprehensive Metabolic Panel; Future  - Lipid Panel; Future  - TSH without Reflex; Future  - Urinalysis; Future    2.  Hyperlipidemia, unspecified hyperlipidemia type  Problem is stable continue diet and exercise check above labs  - EKG 12 Lead  - CBC Auto Differential; Future  - Comprehensive Metabolic Panel; Future  - Lipid Panel; Future  - TSH without Reflex; Future  - Urinalysis; Future    3. Essential hypertension  Problem is stable continue present meds  - EKG 12 Lead  - CBC Auto Differential; Future  - Comprehensive Metabolic Panel; Future  - Lipid Panel; Future  - TSH without Reflex; Future  - Urinalysis; Future    4. Hyperglycemia  Problem is stable continue diet and exercise    5. Obstructive sleep apnea syndrome  Problem is stable continue AutoPap    6. Depression, unspecified depression type  Stable continue to monitor continue exercise and diet    7. EKG abnormalities  Problem is stable check EKG  - CBC Auto Differential; Future  - Comprehensive Metabolic Panel; Future  - Lipid Panel; Future  - TSH without Reflex; Future  - Urinalysis;  Future

## 2020-09-22 DIAGNOSIS — E78.5 HYPERLIPIDEMIA, UNSPECIFIED HYPERLIPIDEMIA TYPE: ICD-10-CM

## 2020-09-22 DIAGNOSIS — I10 ESSENTIAL HYPERTENSION: ICD-10-CM

## 2020-09-22 DIAGNOSIS — R94.31 EKG ABNORMALITIES: ICD-10-CM

## 2020-09-22 DIAGNOSIS — Z00.00 WELL ADULT EXAM: ICD-10-CM

## 2020-09-22 LAB
A/G RATIO: 2.3 (ref 1.1–2.2)
ALBUMIN SERPL-MCNC: 4.5 G/DL (ref 3.4–5)
ALP BLD-CCNC: 60 U/L (ref 40–129)
ALT SERPL-CCNC: 23 U/L (ref 10–40)
ANION GAP SERPL CALCULATED.3IONS-SCNC: 11 MMOL/L (ref 3–16)
AST SERPL-CCNC: 35 U/L (ref 15–37)
BASOPHILS ABSOLUTE: 0 K/UL (ref 0–0.2)
BASOPHILS RELATIVE PERCENT: 0.7 %
BILIRUB SERPL-MCNC: 0.4 MG/DL (ref 0–1)
BILIRUBIN URINE: NEGATIVE
BLOOD, URINE: NEGATIVE
BUN BLDV-MCNC: 19 MG/DL (ref 7–20)
CALCIUM SERPL-MCNC: 9.7 MG/DL (ref 8.3–10.6)
CHLORIDE BLD-SCNC: 102 MMOL/L (ref 99–110)
CHOLESTEROL, TOTAL: 202 MG/DL (ref 0–199)
CLARITY: CLEAR
CO2: 27 MMOL/L (ref 21–32)
COLOR: YELLOW
CREAT SERPL-MCNC: 0.8 MG/DL (ref 0.6–1.2)
EOSINOPHILS ABSOLUTE: 0.3 K/UL (ref 0–0.6)
EOSINOPHILS RELATIVE PERCENT: 4.8 %
EPITHELIAL CELLS, UA: 2 /HPF (ref 0–5)
GFR AFRICAN AMERICAN: >60
GFR NON-AFRICAN AMERICAN: >60
GLOBULIN: 2 G/DL
GLUCOSE BLD-MCNC: 103 MG/DL (ref 70–99)
GLUCOSE URINE: NEGATIVE MG/DL
HCT VFR BLD CALC: 40.9 % (ref 36–48)
HDLC SERPL-MCNC: 50 MG/DL (ref 40–60)
HEMOGLOBIN: 13.8 G/DL (ref 12–16)
HYALINE CASTS: 1 /LPF (ref 0–8)
KETONES, URINE: NEGATIVE MG/DL
LDL CHOLESTEROL CALCULATED: 120 MG/DL
LEUKOCYTE ESTERASE, URINE: ABNORMAL
LYMPHOCYTES ABSOLUTE: 0.6 K/UL (ref 1–5.1)
LYMPHOCYTES RELATIVE PERCENT: 11.2 %
MCH RBC QN AUTO: 29.8 PG (ref 26–34)
MCHC RBC AUTO-ENTMCNC: 33.8 G/DL (ref 31–36)
MCV RBC AUTO: 88.2 FL (ref 80–100)
MICROSCOPIC EXAMINATION: YES
MONOCYTES ABSOLUTE: 0.5 K/UL (ref 0–1.3)
MONOCYTES RELATIVE PERCENT: 8.8 %
NEUTROPHILS ABSOLUTE: 4 K/UL (ref 1.7–7.7)
NEUTROPHILS RELATIVE PERCENT: 74.5 %
NITRITE, URINE: NEGATIVE
PDW BLD-RTO: 13.4 % (ref 12.4–15.4)
PH UA: 5.5 (ref 5–8)
PLATELET # BLD: 210 K/UL (ref 135–450)
PMV BLD AUTO: 10.6 FL (ref 5–10.5)
POTASSIUM SERPL-SCNC: 4.2 MMOL/L (ref 3.5–5.1)
PROTEIN UA: NEGATIVE MG/DL
RBC # BLD: 4.64 M/UL (ref 4–5.2)
RBC UA: 1 /HPF (ref 0–4)
SODIUM BLD-SCNC: 140 MMOL/L (ref 136–145)
SPECIFIC GRAVITY UA: 1.02 (ref 1–1.03)
TOTAL PROTEIN: 6.5 G/DL (ref 6.4–8.2)
TRIGL SERPL-MCNC: 161 MG/DL (ref 0–150)
TSH SERPL DL<=0.05 MIU/L-ACNC: 1.54 UIU/ML (ref 0.27–4.2)
URINE TYPE: ABNORMAL
UROBILINOGEN, URINE: 0.2 E.U./DL
VLDLC SERPL CALC-MCNC: 32 MG/DL
WBC # BLD: 5.3 K/UL (ref 4–11)
WBC UA: 15 /HPF (ref 0–5)

## 2020-09-30 ENCOUNTER — NURSE ONLY (OUTPATIENT)
Dept: INTERNAL MEDICINE CLINIC | Age: 65
End: 2020-09-30
Payer: COMMERCIAL

## 2020-09-30 ENCOUNTER — HOSPITAL ENCOUNTER (OUTPATIENT)
Dept: MAMMOGRAPHY | Age: 65
Discharge: HOME | End: 2020-09-30
Payer: COMMERCIAL

## 2020-09-30 PROCEDURE — 90750 HZV VACC RECOMBINANT IM: CPT | Performed by: INTERNAL MEDICINE

## 2020-09-30 PROCEDURE — 77063 BREAST TOMOSYNTHESIS BI: CPT

## 2020-09-30 PROCEDURE — 90471 IMMUNIZATION ADMIN: CPT | Performed by: INTERNAL MEDICINE

## 2020-10-01 ENCOUNTER — TELEPHONE (OUTPATIENT)
Dept: INTERNAL MEDICINE CLINIC | Age: 65
End: 2020-10-01

## 2020-10-01 NOTE — TELEPHONE ENCOUNTER
Can repeat urinalysis and urine culture to verify if it is infected. Is patient symptomatic? Message sent to patient.

## 2020-10-01 NOTE — TELEPHONE ENCOUNTER
Test Results Question     From   Tiago Snow  To   [facility] Support  Sent   9/30/2020  8:50 PM    WBC, UA 15 /HPF   0 - 5 /HPF     Can someone please explain this test result from my microscopic urinalysis?  It appears to be high both this test and in a previous test?   Thank you,   Ramya Elmore

## 2021-02-24 RX ORDER — LOSARTAN POTASSIUM AND HYDROCHLOROTHIAZIDE 12.5; 5 MG/1; MG/1
TABLET ORAL
Qty: 90 TABLET | Refills: 0 | Status: SHIPPED | OUTPATIENT
Start: 2021-02-24 | End: 2021-05-13

## 2021-04-15 RX ORDER — ESCITALOPRAM OXALATE 10 MG/1
TABLET ORAL
Qty: 90 TABLET | Refills: 1 | Status: SHIPPED | OUTPATIENT
Start: 2021-04-15 | End: 2021-09-30 | Stop reason: SDUPTHER

## 2021-05-13 RX ORDER — LOSARTAN POTASSIUM AND HYDROCHLOROTHIAZIDE 12.5; 5 MG/1; MG/1
TABLET ORAL
Qty: 90 TABLET | Refills: 0 | Status: SHIPPED | OUTPATIENT
Start: 2021-05-13 | End: 2021-08-20 | Stop reason: SDUPTHER

## 2021-08-20 RX ORDER — LOSARTAN POTASSIUM AND HYDROCHLOROTHIAZIDE 12.5; 5 MG/1; MG/1
TABLET ORAL
Qty: 30 TABLET | Refills: 0 | Status: SHIPPED | OUTPATIENT
Start: 2021-08-20 | End: 2021-09-15

## 2021-08-20 NOTE — TELEPHONE ENCOUNTER
----- Message from Raquel Linares sent at 8/13/2021  9:52 AM EDT -----  Subject: Refill Request    QUESTIONS  Name of Medication? losartan-hydroCHLOROthiazide (HYZAAR) 50-12.5 MG per   tablet  Patient-reported dosage and instructions? 1 50-12.5 mg tab taken once   daily  How many days do you have left? Unknown  Preferred Pharmacy? [address] [facility] phone number (if available)? [phone]  ---------------------------------------------------------------------------  --------------  Delmar FISHER  What is the best way for the office to contact you? OK to leave message on   voicemail, OK to respond with electronic message via [facility] portal (only   for patients who have registered [facility] account)  Preferred Call Back Phone Number?  [phone]

## 2021-09-15 RX ORDER — LOSARTAN POTASSIUM AND HYDROCHLOROTHIAZIDE 12.5; 5 MG/1; MG/1
TABLET ORAL
Qty: 30 TABLET | Refills: 0 | Status: SHIPPED | OUTPATIENT
Start: 2021-09-15 | End: 2021-09-30 | Stop reason: SDUPTHER

## 2021-09-30 ENCOUNTER — OFFICE VISIT (OUTPATIENT)
Dept: INTERNAL MEDICINE CLINIC | Age: 66
End: 2021-09-30
Payer: COMMERCIAL

## 2021-09-30 VITALS
WEIGHT: 181 LBS | DIASTOLIC BLOOD PRESSURE: 82 MMHG | HEIGHT: 62 IN | BODY MASS INDEX: 33.31 KG/M2 | SYSTOLIC BLOOD PRESSURE: 125 MMHG | HEART RATE: 66 BPM

## 2021-09-30 DIAGNOSIS — G47.33 OBSTRUCTIVE SLEEP APNEA SYNDROME: ICD-10-CM

## 2021-09-30 DIAGNOSIS — E78.5 HYPERLIPIDEMIA, UNSPECIFIED HYPERLIPIDEMIA TYPE: ICD-10-CM

## 2021-09-30 DIAGNOSIS — Z23 NEED FOR INFLUENZA VACCINATION: ICD-10-CM

## 2021-09-30 DIAGNOSIS — H91.90 DECREASED HEARING, UNSPECIFIED LATERALITY: ICD-10-CM

## 2021-09-30 DIAGNOSIS — R73.9 HYPERGLYCEMIA: ICD-10-CM

## 2021-09-30 DIAGNOSIS — I10 ESSENTIAL HYPERTENSION: ICD-10-CM

## 2021-09-30 DIAGNOSIS — Z00.00 PE (PHYSICAL EXAM), ANNUAL: Primary | ICD-10-CM

## 2021-09-30 DIAGNOSIS — F32.A DEPRESSION, UNSPECIFIED DEPRESSION TYPE: ICD-10-CM

## 2021-09-30 PROCEDURE — 99396 PREV VISIT EST AGE 40-64: CPT | Performed by: INTERNAL MEDICINE

## 2021-09-30 PROCEDURE — 90674 CCIIV4 VAC NO PRSV 0.5 ML IM: CPT | Performed by: INTERNAL MEDICINE

## 2021-09-30 PROCEDURE — 90471 IMMUNIZATION ADMIN: CPT | Performed by: INTERNAL MEDICINE

## 2021-09-30 RX ORDER — ESCITALOPRAM OXALATE 10 MG/1
TABLET ORAL
Qty: 90 TABLET | Refills: 2 | Status: SHIPPED | OUTPATIENT
Start: 2021-09-30 | End: 2022-06-13

## 2021-09-30 RX ORDER — AZITHROMYCIN 250 MG/1
250 TABLET, FILM COATED ORAL SEE ADMIN INSTRUCTIONS
Qty: 6 TABLET | Refills: 0 | Status: SHIPPED | OUTPATIENT
Start: 2021-09-30 | End: 2021-10-05

## 2021-09-30 RX ORDER — LOSARTAN POTASSIUM AND HYDROCHLOROTHIAZIDE 12.5; 5 MG/1; MG/1
TABLET ORAL
Qty: 90 TABLET | Refills: 3 | Status: SHIPPED | OUTPATIENT
Start: 2021-09-30 | End: 2022-07-13 | Stop reason: SDUPTHER

## 2021-09-30 SDOH — ECONOMIC STABILITY: FOOD INSECURITY: WITHIN THE PAST 12 MONTHS, THE FOOD YOU BOUGHT JUST DIDN'T LAST AND YOU DIDN'T HAVE MONEY TO GET MORE.: NEVER TRUE

## 2021-09-30 SDOH — ECONOMIC STABILITY: FOOD INSECURITY: WITHIN THE PAST 12 MONTHS, YOU WORRIED THAT YOUR FOOD WOULD RUN OUT BEFORE YOU GOT MONEY TO BUY MORE.: NEVER TRUE

## 2021-09-30 ASSESSMENT — SOCIAL DETERMINANTS OF HEALTH (SDOH): HOW HARD IS IT FOR YOU TO PAY FOR THE VERY BASICS LIKE FOOD, HOUSING, MEDICAL CARE, AND HEATING?: NOT HARD AT ALL

## 2021-09-30 NOTE — PROGRESS NOTES
Annual Wellness Visit     Patient:  Jarold Severe                                               : 1955  Age: 60 y.o. MRN: <[id number]>  Date : 2021      CHIEF COMPLAINT: Jarold Severe is a 60 y.o. female who presents for : Physical exam    1. Depression, unspecified depression type  Recent exacerbation of symptoms as a result of the illness of her's daughter    2. Essential hypertension  Problem is stable    3. Hyperglycemia  No symptoms of polyuria polydipsia polyphagia  - azithromycin (ZITHROMAX) 250 MG tablet; Take 1 tablet by mouth See Admin Instructions for 5 days 500mg on day 1 followed by 250mg on days 2 - 5  Dispense: 6 tablet; Refill: 0  - CBC Auto Differential; Future  - Comprehensive Metabolic Panel; Future  - Lipid Panel; Future  - TSH without Reflex; Future  - Urinalysis; Future  [facility]    4. Hyperlipidemia, unspecified hyperlipidemia type  This problem is stable n watching her diet  - azithromycin (ZITHROMAX) 250 MG tablet; Take 1 tablet by mouth See Admin Instructions for 5 days 500mg on day 1 followed by 250mg on days 2 - 5  Dispense: 6 tablet; Refill: 0  - CBC Auto Differential; Future  - Comprehensive Metabolic Panel; Future  - Lipid Panel; Future  - TSH without Reflex; Future  - Urinalysis; Future  [facility]    5. Obstructive sleep apnea syndrome  This problem is stable with Pap    6. PE (physical exam), annual  Generally has been feeling okay but has been under a lot of stress since the illness of her daughter in addition she recently sprained her left ankle but this is getting better  - azithromycin (ZITHROMAX) 250 MG tablet; Take 1 tablet by mouth See Admin Instructions for 5 days 500mg on day 1 followed by 250mg on days 2 - 5  Dispense: 6 tablet; Refill: 0  - CBC Auto Differential; Future  - Comprehensive Metabolic Panel; Future  - Lipid Panel;  Future  - TSH without Reflex; Future  - Urinalysis; Future  - Elis Azul MD, Plastic Surgery, [facility]    7. Decreased hearing, unspecified laterality  Decreased hearing  - [id number], [place], [place]. 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    Hyperglycemia 01/05/2018    Essential hypertension 01/12/2017    EKG abnormalities 08/30/2016    CTS (carpal tunnel syndrome) 05/29/2014    Premature ovarian failure 05/29/2014    Depression 01/31/2013    Sleep apnea 01/31/2013    Hyperlipidemia 01/31/2013       Constitutional:  Denies fever or chills   Eyes:  Denies change in visual acuity   HENT:  Denies nasal congestion or sore throat   Respiratory:  Denies cough or shortness of breath   Cardiovascular:  Denies chest pain or edema   GI:  Denies abdominal pain, nausea, vomiting, bloody stools or diarrhea   :  Denies dysuria   Musculoskeletal:  Denies back pain or joint pain   Integument:  Denies rash   Neurologic:  Denies headache, focal weakness or sensory changes   Endocrine:  Denies polyuria or polydipsia   Lymphatic:  Denies swollen glands   Psychiatric:  Denies depression or anxiety     Past Medical History:        Diagnosis Date    CTS (carpal tunnel syndrome) 5/29/2014    Depression 1/31/2013    EKG abnormalities 8/30/2016    HTN (hypertension) 1/31/2013    Hyperglycemia 1/5/2018    Premature ovarian failure 5/29/2014    Sleep apnea 1/31/2013       Past Surgical History:        Procedure Laterality Date    CARPAL TUNNEL RELEASE      COLONOSCOPY         Family History:  No family history on file.     Social History:  Social History     Socioeconomic History    Marital status:      Spouse name: None    Number of children: None    Years of education: None    Highest education level: None   Occupational History    None   Tobacco Use    Smoking status: Never Smoker    Smokeless tobacco: Never Used   Vaping Use  Vaping Use: Never used   Substance and Sexual Activity    Alcohol use: Yes     Alcohol/week: 0.8 standard drinks     Types: 1 Standard drinks or equivalent per week    Drug use: None    Sexual activity: None   Other Topics Concern    None   Social History Narrative    ** Merged History Encounter **          Social Determinants of Health     Financial Resource Strain: Low Risk     Difficulty of Paying Living Expenses: Not hard at all   Food Insecurity: No Food Insecurity    Worried About Running Out of Food in the Last Year: Never true    [address] in the Last Year: Never true   Transportation Needs:     Lack of Transportation (Medical):  Lack of Transportation (Non-Medical):    Physical Activity:     Days of Exercise per Week:     Minutes of Exercise per Session:    Stress:     Feeling of Stress :    Social Connections:     Frequency of Communication with Friends and Family:     Frequency of Social Gatherings with Friends and Family:     Attends Taoist Services:     Active Member of Clubs or Organizations:     Attends Club or Organization Meetings:     Marital Status:    Intimate Partner Violence:     Fear of Current or Ex-Partner:     Emotionally Abused:     Physically Abused:     Sexually Abused: Allergies:  Bee venom, Pcn [penicillins], and Penicillins    Current Medications:    Prior to Admission medications    Medication Sig Start Date End Date Taking?  Authorizing Provider   azithromycin (ZITHROMAX) 250 MG tablet Take 1 tablet by mouth See Admin Instructions for 5 days 500mg on day 1 followed by 250mg on days 2 - 5 9/30/21 10/5/21 Yes Chiquis Castaneda MD   losartan-hydroCHLOROthiazide [facility]) 50-12.5 MG per tablet 1 PO DAILY 9/30/21  Yes Chiquis Castaneda MD   escitalopram (LEXAPRO) 10 MG tablet TAKE ONE TABLET BY MOUTH DAILY 9/30/21  Yes Chiquis Castaneda MD   vitamin D (CHOLECALCIFEROL) 1000 UNIT TABS tablet Take 1,000 Units by mouth daily   Yes Historical Provider, MD calcium carbonate (OSCAL) 500 MG TABS tablet Take 500 mg by mouth daily   Yes Historical Provider, MD   ascorbic acid (VITAMIN C) 500 MG tablet Take 1,000 mg by mouth daily   Yes Historical Provider, MD           Physical Exam:      Constitutional:  Well developed, overweight, no acute distress, non-toxic appearance   Eyes:  PERRL, conjunctiva normal   HENT:  Atraumatic, external ears normal, nose normal, oropharynx moist, no pharyngeal exudates. Neck- normal range of motion, no tenderness, supple   Respiratory:  No respiratory distress, normal breath sounds, no rales, no wheezing   Cardiovascular:  Normal rate, normal rhythm, no murmurs, no gallops, no rubs   GI:  Soft, nondistended, normal bowel sounds, nontender, no organomegaly, no mass, no rebound, no guarding   :  No costovertebral angle tenderness   Musculoskeletal:  No edema, no tenderness, no deformities. Back- no tenderness  Integument:  Well hydrated, no rash   Lymphatic:  No lymphadenopathy noted   Neurologic:  Alert & oriented x 3, CN 2-12 normal, normal motor function, normal sensory function, no focal deficits noted   Psychiatric:  Speech and behavior appropriate       Vitals: /82   Pulse 66   Ht 5' 2\" (1.575 m)   Wt 181 lb (82.1 kg)   BMI 33.11 kg/m²     Body mass index is 33.11 kg/m².   Wt Readings from Last 3 Encounters:   09/30/21 181 lb (82.1 kg)   09/16/20 182 lb (82.6 kg)   04/03/19 195 lb (88.5 kg)         LABS:    CBC:   Lab Results   Component Value Date    WBC 5.3 09/22/2020    HGB 13.8 09/22/2020    HCT 40.9 09/22/2020    MCV 88.2 09/22/2020     09/22/2020         No results found for: IRON, TIBC, FERRITIN, FOLATE, [id number], PTH                                                          BMP:    Lab Results   Component Value Date     09/22/2020    K 4.2 09/22/2020     09/22/2020    CO2 27 09/22/2020       LFT's:   Lab Results   Component Value Date    ALT 23 09/22/2020    AST 35 09/22/2020    GGT 27 05/29/2014    ALKPHOS 60 09/22/2020    BILITOT 0.4 09/22/2020       Lipids:   Lab Results   Component Value Date    CHOL 202 (H) 09/22/2020    HDL 50 09/22/2020    LDLCALC 120 (H) 09/22/2020    TRIG 161 (H) 09/22/2020       INR: No results found for: INR, PROTIME    U/A:  Lab Results   Component Value Date    LABMICR YES 09/22/2020          Lab Results   Component Value Date    LABA1C 6.1 04/15/2019        Lab Results   Component Value Date    CREATININE 0.8 09/22/2020       -----------------------------------------------------------------     Assessment/Plan:   1. Depression, unspecified depression type  Slight exacerbation as result of family issues we will continue Lexapro for now    2. Essential hypertension  Problem is stable continue present meds    3. Hyperglycemia  This problem is stable check above labs if blood sugar elevated will add glycol  - azithromycin (ZITHROMAX) 250 MG tablet; Take 1 tablet by mouth See Admin Instructions for 5 days 500mg on day 1 followed by 250mg on days 2 - 5  Dispense: 6 tablet; Refill: 0  - CBC Auto Differential; Future  - Comprehensive Metabolic Panel; Future  - Lipid Panel; Future  - TSH without Reflex; Future  - Urinalysis; Future  - Elis Fine MD, Plastic [facility]    4. Hyperlipidemia, unspecified hyperlipidemia type  This problem is stable continue diet and exercise  - azithromycin (ZITHROMAX) 250 MG tablet; Take 1 tablet by mouth See Admin Instructions for 5 days 500mg on day 1 followed by 250mg on days 2 - 5  Dispense: 6 tablet; Refill: 0  - CBC Auto Differential; Future  - Comprehensive Metabolic Panel; Future  - Lipid Panel; Future  - TSH without Reflex; Future  - Urinalysis; Future  - Elis Fine MD, [facility]    5. Obstructive sleep apnea syndrome  Problem stable on CPAP    6.  PE (physical exam), annual  Check screening labs continue diet and exercise will refer to plastic surgery for a inclusion cyst of the scalp which is gotten bigger also placed on azithromycin for symptoms of bronchitis x2 weeks  - azithromycin (ZITHROMAX) 250 MG tablet; Take 1 tablet by mouth See Admin Instructions for 5 days 500mg on day 1 followed by 250mg on days 2 - 5  Dispense: 6 tablet; Refill: 0  - CBC Auto Differential; Future  - Comprehensive Metabolic Panel; Future  - Lipid Panel; Future  - TSH without Reflex; Future  - Urinalysis; Future  - Elis Silverio MD, Plastic Surgery, [facility]    7. Decreased hearing, unspecified laterality  Referral to audiology  - Heath Brock, Audiology, [facility]  Bronchitis x2 weeks start [place]

## 2021-10-06 ENCOUNTER — HOSPITAL ENCOUNTER (OUTPATIENT)
Dept: MAMMOGRAPHY | Age: 66
Discharge: HOME | End: 2021-10-06
Payer: COMMERCIAL

## 2021-10-06 VITALS — BODY MASS INDEX: 33.31 KG/M2 | HEIGHT: 62 IN | WEIGHT: 181 LBS

## 2021-10-06 DIAGNOSIS — Z12.31 VISIT FOR SCREENING MAMMOGRAM: ICD-10-CM

## 2021-10-06 PROCEDURE — 77063 BREAST TOMOSYNTHESIS BI: CPT

## 2021-10-11 ENCOUNTER — TELEPHONE (OUTPATIENT)
Dept: INTERNAL MEDICINE CLINIC | Age: 66
End: 2021-10-11

## 2021-10-11 ENCOUNTER — HOSPITAL ENCOUNTER (OUTPATIENT)
Dept: GENERAL RADIOLOGY | Age: 66
Discharge: HOME | End: 2021-10-11
Payer: COMMERCIAL

## 2021-10-11 ENCOUNTER — HOSPITAL ENCOUNTER (OUTPATIENT)
Age: 66
Discharge: HOME | End: 2021-10-11
Payer: COMMERCIAL

## 2021-10-11 DIAGNOSIS — S99.911A INJURY OF RIGHT ANKLE, INITIAL ENCOUNTER: ICD-10-CM

## 2021-10-11 DIAGNOSIS — S99.911A INJURY OF RIGHT ANKLE, INITIAL ENCOUNTER: Primary | ICD-10-CM

## 2021-10-11 PROCEDURE — 73610 X-RAY EXAM OF ANKLE: CPT

## 2021-10-11 NOTE — TELEPHONE ENCOUNTER
Pt has an appointment for 2:30 and had an xray that was done today for her ankle needs to be forwarded over to [address] [facility] on [facility] [phone]). We can PAT, RAD connect or nucleus to send it over. They need the actual picture of it. Pt needs this to be expedited. Please advise.

## 2021-11-02 ENCOUNTER — TELEPHONE (OUTPATIENT)
Dept: INTERNAL MEDICINE CLINIC | Age: 66
End: 2021-11-02

## 2021-11-02 NOTE — TELEPHONE ENCOUNTER
Patient wants to know can she wait until the first of the year to go to the doctor you referred her to Dr. Ana Cristina Lewis. She didn't know if it was urgent or could it wait until then. Also patient wants to know what can be done for her congestion. She said she has taken everything she could and it didn't work. She is still congested .         Please advise and call

## 2021-11-03 ENCOUNTER — TELEPHONE (OUTPATIENT)
Dept: INTERNAL MEDICINE CLINIC | Age: 66
End: 2021-11-03

## 2021-11-03 NOTE — TELEPHONE ENCOUNTER
Emily Watson, [place], [place] 2 minutes ago (2:58 PM)   AK  So far I have used Sudafed, Alavert, Robitussin, and Flonase. None seem to clear up the congestion - sneezing - (and coughing after eating) have persisted for 2months. Any other suggestions?   Emily

## 2022-01-10 ENCOUNTER — PROCEDURE VISIT (OUTPATIENT)
Dept: AUDIOLOGY | Age: 67
End: 2022-01-10
Payer: MEDICARE

## 2022-01-10 DIAGNOSIS — H90.3 SENSORINEURAL HEARING LOSS, BILATERAL: Primary | ICD-10-CM

## 2022-01-10 PROCEDURE — 92567 TYMPANOMETRY: CPT | Performed by: AUDIOLOGIST

## 2022-01-10 PROCEDURE — 92557 COMPREHENSIVE HEARING TEST: CPT | Performed by: AUDIOLOGIST

## 2022-01-10 NOTE — PATIENT INSTRUCTIONS
Good Communication Strategies    Communication can be challenging for anyone, but can be especially difficult for those with some degree of hearing loss. While we may not be able to control every factor that may lead to difficulty with communication, there are Good Communication Strategies that we can all use in our day-to-day lives. Communication takes both parties working together for it to be successful. Tips as a Listener:   1. Control your environment. It is important to limit the amount of background noise in the room when possible. You should also consider having a good light source in the room to best see the other person. 2. Ask for clarification. Instead of saying \"What?\", you can use parts of what you heard to make a new question. For example, if you heard the word \"Thursday\" but not the rest of the week, you may ask \"What was that about Thursday? \" or \"What did you want to do Thursday? \". This shows the person talking that you are listening and will help them better explain what they are saying. 3. Be an advocate for yourself. If you are hearing but not understanding, tell the other person \"I can hear you, but I need you to slow down when you speak. \"  Or if someone is facing the other direction, say \"I cannot hear you when you are not looking at me when we talk. \"       Tips as a Talker:   - Sit or stand 3 to 6 feet away to maximize audibility         -- It is unrealistic to believe someone else will fully hear your message if you are speaking from across the room or in a different room in the house   - Stay at eye level to help with visual cues   - Make sure you have the persons attention before speaking   - Use facial expressions and gestures to accentuate your message   - Raise your voice slightly (do not scream)   - Speak slowly and distinctly   - Use short, simple sentences   - Rephrase your words if the person is having a hard time understanding you    - To avoid distortion, dont speak directly into a persons ear      Some additional items that may be helpful:   - Remain patient - this is important for both parties   - Write down items that still cannot be heard/understood. You may write with pen/paper or consider typing/texting on a cell phone or smart device. - If background noise is unavoidable, try to keep yourself in a good position in the room. By sitting at a car on the side of the restaurant (preferably a corner), it will be easier to communicate than if you were sitting at a table in the middle with background noise surrounding you. Keep yourself positioned away from music speakers or heavy foot traffic.   - If you have difficulty with the television, consider these options:      -- Use closed-captioning, which is a setting you can turn on that displays the spoken words in a written form on the screen. There may be a slight delay, but this can help fill in missing information. This can be especially helpful when watching programs with accented speech. -- Consider use of a sound bar or speakers that come from the front of the TV. With modern flat screen TVs, many of them have speakers that come out of the back of the device, which makes sound bounce off the wall behind it, then go into the room. Sound bars can allow the sound to go straight in your direction and can improve sound quality. -- Consider ear level devices to help improve the volume and/or sound quality of the program.  There are devices that work like headphones that you can adjust the volume for your ears while others can have the volume at a more comfortable level, such as \"TV Ears\". Most hearing aids have devices that allow them to connect directly to the TV and improve sound quality. Hearing Loss: Care Instructions  Your Care Instructions      Hearing loss is a sudden or slow decrease in how well you hear. It can range from mild to profound.  Permanent hearing loss can occur with aging, and it can happen when you are exposed long-term to loud noise. Examples include listening to loud music, riding motorcycles, or being around other loud machines. Hearing loss can affect your work and home life. It can make you feel lonely or depressed. You may feel that you have lost your independence. But hearing aids and other devices can help you hear better and feel connected to others. Follow-up care is a key part of your treatment and safety. Be sure to make and go to all appointments, and call your doctor if you are having problems. It's also a good idea to know your test results and keep a list of the medicines you take. How can you care for yourself at home? · Avoid loud noises whenever possible. This helps keep your hearing from getting worse. Always wear hearing protection around loud noises. · If appropriate, wear hearing aid(s) as directed. It is recommended that hearing aids are worn during all waking hours to keep your brain active and give it access to the sounds it is missing. · If you are beginning your process with hearing aid(s), schedule a \"Hearing Aid Evaluation\" with an audiologist to discuss your lifestyle, features of hearing aid technology, and styles of hearing aids available. It is recommended that you contact your insurance company to determine if you have a hearing aid benefit, as this may dictate who you can see for these services. · Have hearing tests as your doctor suggests. They can show whether your hearing has changed. Your hearing aid may need to be adjusted. · Use other assistive devices as needed. These may include:  ? Telephone amplifiers and hearing aids that can connect to a television, stereo, radio, or microphone. ? Devices that use lights or vibrations. These alert you to the doorbell, a ringing telephone, or a baby monitor. ? Television closed-captioning. This shows the words at the bottom of the screen. Most new TVs can do this. ? TTY (text telephone). understanding of speech. OCCUPATIONAL NOISE EXPOSURE RECREATIONAL NOISE EXPOSURE   Some jobs may have exposure to loud sounds in the workplace. These jobs may include but are not limited to:  Eliza Huitron [employer]   Construction   Welding   Landscaping   Hairdressing/hairstyling   Musicians  [employer]    ... And more! Many activities outside of work may cause permanent hearing loss. These activities may include but are not limited to:  Lawnmowers, leaf blowers  [employer] (such as pigs squealing)   Chainsaws and other power tools  [facility] musical instruments and/or singing   Listening to music too loudly - at concerts, through stereo, through ear buds or headphones   Attending sporting events   Attending fireworks shows or using fireworks at home  [employer] of firearms   . .. And more! REDUCE OR PROTECT YOUR EARS FROM NOISE EXPOSURE    To do your best to avoid noise-induced hearing loss, here are some tips:   Limit exposure to loud sounds. 85 dB (decibels) is safe for 8 hours. As sounds are louder, the length of time the sound is safe lessens. These numbers are cumulative across a 24-hour period. (NIOSH and CDC, 2002)  o 85 dB is safe for 8 hours  o 88 dB is safe for 4 hours  o 91 dB is safe for 2 hours  o 94 dB is safe for 1 hour  o 97 dB is safe for 30 minutes  o 100 dB is safe for 15 minutes  o 103 dB is safe for 7.5 minutes  o 106 dB is safe for 3.75 minutes  o 109 dB is safe for LESS THAN 2 minutes  o 112 dB is safe for LESS THAN 1 minute  o 115 dB is safe for ~ 30 seconds  o 130 dB can cause IMMEDIATE hearing loss   If you are unsure if a sound is too loud, consider checking the sound level with a \"sound level meter\". There are apps on smart devices, such as \"Decibel X\", that can measure the loudness of the sound.   They are not as accurate as expensive equipment used by scientists, but it will give you a guesstimate of how loud the sound is, and if it may be damaging to your hearing.  If you cannot avoid loud sounds, here are ways to reduce your exposure:  o 1. Wear hearing protection  - Ear plugs and protective ear muffs can be used to reduce the intensity of the sound. The higher the NRR (noise reduction rating), the better reduction of the intensity of the sound   o 2. Turn the volume down  - When listening to music, turn the volume down, especially when wearing ear buds or headphones. A good rule of thumb is to not go beyond the middle setting on your device. If you can't hear someone talking to you from arm's length away, your music may be at a level that it can cause damage. If someone else can hear your music from 3 feet away, it may also be at a level that it can cause damage. o 3. Walk away from the sound  - If you do not have the ability to wear hearing protection or turn down the volume of the sound, you should do your best to move away from the source of the sound. - Sound decreases in intensity as we move further from the source. The sound will decrease by 6 dB for every doubling of distance from the sound source. TYPES OF HEARING PROTECTION    The most common types of hearing protection are protective ear muffs and ear plugs. Protective ear muffs are commonly found at home improvement or sporting good stores, they can be worn time and time again and are great if you need to take your hearing protection off frequently. Ear plugs are often made of foam or soft silicone. The foam ones are designed for one-time use, while silicone ear plugs may be used multiple times. There are also \"filtered\" ear plugs that help provide even attenuation of the sound across all frequencies. These are great for listening to music or going to concerts, and allow for better understanding of speech in louder environments.   They can be purchased at music stores or online retailers (search \"Ety Plugs\" or \"filtered ear plugs\"), or custom earmolds can be made with an audiologist.    There are \"custom\" hearing protection devices that you can further discuss with your audiologist based on your specific needs, if desired. Exposure to these sounds may cause permanent damage to your hearing.   If you suspect your hearing has changed, it is recommended that you have your hearing tested by your audiologist.

## 2022-01-10 NOTE — PROGRESS NOTES
Rosi Douglasid   1955, 73 y.o. female   <[id number]>       Referring Provider: Eduard Villalpando MD   Referral Type: In an order in [address] [place] [address]    Reason for Visit: Evaluation of suspected change in hearing, tinnitus, or balance. ADULT AUDIOLOGIC EVALUATION      Emily Gaytan is a 73 y.o. female seen today, 1/10/2022, for an initial audiologic evaluation. AUDIOLOGIC AND OTHER PERTINENT MEDICAL HISTORY:        Emily Hoffman noted decreased hearing bilaterally, gradual, family has noted concern; some noise exposure from music, recently retired from being a [employer] for 40 years; recent sinus infection for past couple months, saw allergist last week and was prescribed medication including steroids, noted improvement in head congestion, did have popping/clicking in ears which has improved. Rosi Ag denied otalgia, otorrhea, tinnitus, dizziness, imbalance, history of head trauma, history of ear surgery, and family history of hearing loss. IMPRESSIONS:       Today's results are consistent with bilateral sensorineural hearing loss, RE>LE 4457-1175 Hz; normal middle ear function and excellent word recognition for both ears. Hearing loss is significant enough to result in difficulty understanding speech in at least some listening environments. Discussed good communication strategies and considerations for amplification. Discussed good communication strategies, future considerations for amplification, and recommended consult to ENT given RE>LE asymmetry. ASSESSMENT AND FINDINGS:       Otoscopy revealed: Clear ear canals bilaterally      RIGHT EAR:  Hearing Sensitivity: Within normal limits through 1000 Hz sloping to moderate sensorineural hearing loss. Speech Recognition Threshold: 25 dBHL  Word Recognition: Excellent (96%), based on NU-6 25-word list at 50 dBHL using recorded speech stimuli.     Tympanometry: Normal peak pressure and compliance, Type A tympanogram, consistent with normal middle ear function. LEFT EAR:  Hearing Sensitivity: Essentially within normal limits through 4000 Hz sloping to mild sensorineural hearing loss. Speech Recognition Threshold: 20 dBHL  Word Recognition: Excellent (100%), based on NU-6 25-word list at 50 dBHL using recorded speech stimuli. Tympanometry: Normal peak pressure and compliance, Type A tympanogram, consistent with normal middle ear function. NOTE: An asymmetry of 15-20 dBHL is present 4840-2731 Hz with right ear worse than left ear. Reliability: Good  Transducer: Inserts, checked with Phones    See scanned audiogram dated 1/10/2022 for results. PATIENT EDUCATION:       The following items were discussed with the patient:   - Good Communication Strategies  - Hearing Loss and Hearing Aids  - Noise-Induced Hearing Loss and use of Hearing Protection Devices (HPDs)     Educational information was shared in the After Visit Summary. RECOMMENDATIONS:                                                                                                                                                                                                                                                                      The following items are recommended based on patient report and results from today's appointment:  - Continue medical follow-up with Vj Welsh MD.  - Recommended consult to ENT given RE>LE asymmetry 1509-3364 Hz.  - Retest hearing as medically indicated and/or sooner if a change in hearing is noted. - If desired, schedule a Hearing Aid Evaluation (HAE) appointment to discuss hearing aid options. - Utilize \"Good Communication Strategies\" as discussed to assist in speech understanding with communication partners. - Use hearing protection devices (HPDs), such as protective ear muffs and ear plugs, when exposed to dangerous sound levels.          [facility] [employer], [employer]  Audiologist      Chart CC'd to: Franca Connors MD       Degree of   Hearing Sensitivity dB Range   Within Normal Limits (WNL) 0 - 20   Mild 20 - 40   Moderate 40 - 55   Moderately-Severe 55 - 70   Severe 70 - 90   Profound 90 +

## 2022-01-12 ENCOUNTER — OFFICE VISIT (OUTPATIENT)
Dept: SURGERY | Age: 67
End: 2022-01-12
Payer: MEDICARE

## 2022-01-12 VITALS
SYSTOLIC BLOOD PRESSURE: 119 MMHG | DIASTOLIC BLOOD PRESSURE: 76 MMHG | BODY MASS INDEX: 33.49 KG/M2 | HEART RATE: 79 BPM | HEIGHT: 62 IN | WEIGHT: 182 LBS | OXYGEN SATURATION: 98 %

## 2022-01-12 DIAGNOSIS — L98.9 SCALP LESION: Primary | ICD-10-CM

## 2022-01-12 PROCEDURE — 1036F TOBACCO NON-USER: CPT | Performed by: SURGERY

## 2022-01-12 PROCEDURE — G8417 CALC BMI ABV UP PARAM F/U: HCPCS | Performed by: SURGERY

## 2022-01-12 PROCEDURE — 1123F ACP DISCUSS/DSCN MKR DOCD: CPT | Performed by: SURGERY

## 2022-01-12 PROCEDURE — 1090F PRES/ABSN URINE INCON ASSESS: CPT | Performed by: SURGERY

## 2022-01-12 PROCEDURE — 3017F COLORECTAL CA SCREEN DOC REV: CPT | Performed by: SURGERY

## 2022-01-12 PROCEDURE — 99204 OFFICE O/P NEW MOD 45 MIN: CPT | Performed by: SURGERY

## 2022-01-12 PROCEDURE — G8427 DOCREV CUR MEDS BY ELIG CLIN: HCPCS | Performed by: SURGERY

## 2022-01-12 PROCEDURE — 4040F PNEUMOC VAC/ADMIN/RCVD: CPT | Performed by: SURGERY

## 2022-01-12 PROCEDURE — G8400 PT W/DXA NO RESULTS DOC: HCPCS | Performed by: SURGERY

## 2022-01-12 PROCEDURE — G8482 FLU IMMUNIZE ORDER/ADMIN: HCPCS | Performed by: SURGERY

## 2022-01-12 NOTE — PROGRESS NOTES
[facility]    CC: Skin lesions    Referring Physician: Henrry Galvez MD    HPI: This is an 73 y. o.female with a PMHx as delineated below who presents to clinic in consultation for a scalp lesion that has grown in size. She notes that it has doubled in size in the past year and also has caused her discomfort with combing. Thus, plastic surgery was consulted for evaluation and treatment. 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/2014    Sleep apnea 1/31/2013     PSHx:   Past Surgical History:   Procedure Laterality Date    CARPAL TUNNEL RELEASE      COLONOSCOPY       Allergy:   Allergies   Allergen Reactions    Bee Venom     Pcn [Penicillins] Hives    Penicillins        SHx:   Social History     Socioeconomic History    Marital status:      Spouse name: Not on file    Number of children: Not on file    Years of education: Not on file    Highest education level: Not on file   Occupational History    Not on file   Tobacco Use    Smoking status: Never Smoker    Smokeless tobacco: Never Used   Vaping Use    Vaping Use: Never used   Substance and Sexual Activity    Alcohol use:  Yes     Alcohol/week: 0.8 standard drinks     Types: 1 Standard drinks or equivalent per week    Drug use: Not on file    Sexual activity: Not on file   Other Topics Concern    Not on file   Social History Narrative    ** Merged History Encounter **          Social Determinants of Health     Financial Resource Strain: Low Risk     Difficulty of Paying Living Expenses: Not hard at all   Food Insecurity: No Food Insecurity    Worried About [address] in the Last Year: Never true    [address] in the Last Year: Never true   Transportation Needs:     Lack of Transportation (Medical): Not on file    Lack of Transportation (Non-Medical): Not on file   Physical Activity:     Days of Exercise per Week: Not on file    Minutes of Exercise per Session: Not on file   Stress:     Feeling of Stress : Not on file   Social Connections:     Frequency of Communication with Friends and Family: Not on file    Frequency of Social Gatherings with Friends and Family: Not on file    Attends Jehovah's witness Services: Not on file    Active Member of [address] [place] [address] or Organizations: Not on file    Attends Club or Organization Meetings: Not on file    Marital Status: Not on file   Intimate Partner Violence:     Fear of Current or Ex-Partner: Not on file    Emotionally Abused: Not on file    Physically Abused: Not on file    Sexually Abused: Not on file   Housing Stability:     Unable to Pay for Housing in the Last Year: Not on file    Number of [place] in the Last Year: Not on file    Unstable Housing in the Last Year: Not on file     FHx: Family history of skin CA: None    Meds:   Current Outpatient Medications   Medication Sig Dispense Refill    losartan-hydroCHLOROthiazide (HYZAAR) 50-12.5 MG per tablet 1 PO DAILY 90 tablet 3    escitalopram (LEXAPRO) 10 MG tablet TAKE ONE TABLET BY MOUTH DAILY 90 tablet 2    vitamin D (CHOLECALCIFEROL) 1000 UNIT TABS tablet Take 1,000 Units by mouth daily      calcium carbonate (OSCAL) 500 MG TABS tablet Take 500 mg by mouth daily      ascorbic acid (VITAMIN C) 500 MG tablet Take 1,000 mg by mouth daily       Current Facility-Administered Medications   Medication Dose Route Frequency Provider Last Rate Last Admin    zoster recombinant adjuvanted vaccine [facility]) injection 50 mcg  0.5 mL IntraMUSCular Once MD MARY [employer]   Constitutional: Negative for chills and fever. HENT: Negative for congestion, facial swelling, and voice change. Eyes: Negative for photophobia and visual disturbance.    Respiratory: Negative for apnea, cough, chest tightness and shortness of breath. Cardiovascular: Negative for chest pain and palpitations. Gastrointestinal: Negative for dysphagia and early satiety. Genitourinary: Negative for difficulty urinating, dysuria, flank pain, frequency and hematuria. Musculoskeletal: Negative for new gait problem, joint swelling and myalgias. Skin: Negative for color change, pallor and rash. Endocrine: negative for tremors, temperature intolerance or polydipsia. Allergic/Immunologic: Negative for new environmental or food allergies. Neurological: Negative for dizziness, seizures, speech difficulty, numbness. Hematological: Negative for adenopathy. Psychiatric/Behavioral: Negative for agitation and confusion. EXAM    /76   Pulse 79   Ht 5' 2\" (1.575 m)   Wt 182 lb (82.6 kg)   SpO2 98%   BMI 33.29 kg/m²     GEN: NAD, pleasant, obese  CVS: RRR  PULM: No respiratory distress  HEENT: PERRLA/EOMI; hearing appears within normal limits  NECK: Supple with trachea in midline, no masses  SCALP: 1 x 1 cm mobile cyst in the anterior scalp    IMP: 73 y. o.female with pilar cyst.  PLAN: Will plan for excision under local at [facility]. Will schedule. A discussion regarding surgical options including: excision was performed with the patient. The pathophysiology of pilar custs was also elucidated specifying need for resection, observation, & margins. Clinical photos were obtained. Additionally, discussion regarding the risks including, but not limited to: bleeding (potentially requiring transfusion or reoperation), infection, seroma, reoperation, poor cosmetic outcome, scarring, alopecia, revisional surgery, diminished sensation, VTE (DVT/PE), and death was performed. All questions were answered in a satisfactory manner. The patient was counseled at length about the risks of maxwell Covid-19 during their perioperative period and any recovery window from their procedure.   The patient was made aware that maxwell Covid-19 may worsen their prognosis for recovering from their procedure  and lend to a higher morbidity and/or mortality risk. All material risks, benefits, and reasonable alternatives including postponing the procedure were discussed. The patient does wish to proceed with the procedure at this time.     Aziza Mcguire MD  [address] [place] [address] Reconstructive Surgery  [phone]  01/12/22

## 2022-01-12 NOTE — LETTER
Surgery Schedule Request Form  [facility] [address] [facility]  The patient received her COVID vaccines 2021 and 2021. The information is documented in Epic. DATE OF SURGERY: 2022   TIME OF SURGERY: 11 am      Confirmation#:__________________        Surgeon Name: Cameron Gunn MD    Phone: [phone]     Fax: [phone]  Procedure Name:  1) Excision of scalp lesion     2) Possible adjacent tissue transfer  CPT CODES (required for scheduling): 22 [id number]   DIAGNOSIS:  L98.9  Scalp lesion    LENGTH OF PROCEDURE: 1 hour  Patient Status: Outpatient    Labs Needed:   CBC __  PT/PTT___ INR __  CMP ___ EKG ___   Urine Hcg ___            PATIENT NAME: Ludivina Flores            YOB: 1955  SEX: female   SS #:   PHONE: [phone] (home)     Pre-Op to be done by: PCP  Cardiac Clearance Done by: per PCP    Pt Position:  supine  Patient to meet with Anesthesiology prior to surgery: no     Medications to be stopped 5 days before surgery: anticoagulation     **Ancef 2 gm IV OCTOR (NOTE:  If patient weight is > 120 kg, Administer 3 gm)  Other Orders: eyestretcher    INSURANCE: Medicare A&B                                              SUBSCRIBER NAME: Self   MEMBER ID:  [id number]                             AUTHORIZATION #: The CPT Code listed on the surgery letter does not require pre certification. PCP: Lisa Castillo MD                                        ANESTHESIA:  Jonelle Hayes MD                             FAX TO: [phone]   QUESTIONS? CALL: Heath Alcala  Fax [phone]            Date Of Procedure: 2022    PATIENT:       Ludivina Flores                    :  1955        Allergies   Allergen Reactions    Bee Venom     Pcn [Penicillins] Hives    Penicillins        No.   PHYSICIAN ORDERS   HUC/  RN      ORDERS WITH CHECK BOXES MUST BE SELECTED.   ALL OTHER ORDERS WILL BE AUTOMATICALLY INITIATED. Date / Time of Order:   1/14/22   8:40 AM          Procedure:  Excision of scalp lesion            Possible adjacent tissue transfer       1. Cefazolin (Ancef) 2 gm IV OCTOR   ** NOTE:  If patient weight is > 120 kg, Administer 3 gm         2. ** If PCN allergy, then Clindamycin 600mg IV OCTOR.   ** If prior history of MRSA, Vancomycin 1g IV OCTOR (please check with renal function prior to administration)       3.  Other orders: Eyestretcher      Physician / Surgeon:  Lola Rooney MD  Office Ph:  [phone]       Physician Signature:     9/07

## 2022-01-12 NOTE — Clinical Note
Extremely sweet patient who would benefit from excision of her lesion under local.  Will schedule. Thanks!   Pauly Alvarado

## 2022-01-14 ENCOUNTER — TELEPHONE (OUTPATIENT)
Dept: SURGERY | Age: 67
End: 2022-01-14

## 2022-01-14 DIAGNOSIS — Z01.818 PRE-OP TESTING: Primary | ICD-10-CM

## 2022-01-14 NOTE — TELEPHONE ENCOUNTER
The patient was in the office to see Dr. Kiana Curran 1-. PLAN: Will plan for excision under local at [facility]. Will schedule. I received a surgery letter. The patient has Medicare A&B as her primary insurance. The CPT Code listed on the surgery letter does not require pre certification. The patient has [employer] Supplement as her secondary insurance. On the back of the insurance card it states : Medicare Supplement does not require pre-certification or services and no network participation required. I spoke with the patient at the home number listed. The patient is now scheduled for surgery with  on 1- at [facility]. The patient does not need an H&P due to local anesthesia. .    The patient received her COVID vaccines 1-7-2021 and 1-. The information is documented in Epic. The patient is scheduled for her post op appointment 2-2-2022. I will fax the surgery letter to [facility] today. I will scan the letter and the fax success into Epic under the media tab. I will email the surgery information and instructions to the patient today at [email or web address]. I will close this phone note.

## 2022-01-14 NOTE — PROGRESS NOTES
Denton De La Cruzon    Age 73 y.o.    female    1955    ZENAIDA [id number]    1/21/2022  Arrival Time_____________  OR Time____________[address] [facility]     Procedure(s):  EXCISION OF SCALP LESION                      Local    Surgeon(s):  Antonio Perez, MD       Phone [phone] ([place])     [address]  Cell         Work  _____________________________________________________________________  _____________________________________________________________________  _____________________________________________________________________  _____________________________________________________________________  _____________________________________________________________________    PCP _____________________________ Phone_________________     H&P__________________Bringing      Chart            Epic   DOS      Called________  EKG__________________Bringing      Chart            Epic   DOS      Called________  LAB__________________ Bringing      Chart            Epic   DOS      Called________  Cardiac Clearance_______Bringing      Chart            Epic      DOS      Called________    Cardiologist________________________ Phone___________________________    ? Restoration concerns / Waiver on Chart            PAT Communications________________  ? Pre-op Instructions Given [place]          _________________________________  ? Directions to Surgery Center                          _________________________________  ? Transportation Home_______________      __________________________________  ?  Crutches/Walker__________________        __________________________________    ________Pre-op Orders   _______Transcribed    _______Wt.  ________Pharmacy          _______SCD  ______VTE     ______TED Lyndy Code  _______  Surgery Consent    _______  Anesthesia Consent         COVID DATE______________LOCATION________________ RESULT__________

## 2022-01-19 ENCOUNTER — OFFICE VISIT (OUTPATIENT)
Dept: ENT CLINIC | Age: 67
End: 2022-01-19
Payer: MEDICARE

## 2022-01-19 VITALS
HEART RATE: 64 BPM | BODY MASS INDEX: 34.04 KG/M2 | SYSTOLIC BLOOD PRESSURE: 119 MMHG | TEMPERATURE: 97 F | DIASTOLIC BLOOD PRESSURE: 64 MMHG | HEIGHT: 62 IN | WEIGHT: 185 LBS

## 2022-01-19 DIAGNOSIS — K13.0 LESION OF LIP: ICD-10-CM

## 2022-01-19 DIAGNOSIS — H90.A21 SENSORINEURAL HEARING LOSS (SNHL) OF RIGHT EAR WITH RESTRICTED HEARING OF LEFT EAR: Primary | ICD-10-CM

## 2022-01-19 PROCEDURE — 1036F TOBACCO NON-USER: CPT | Performed by: OTOLARYNGOLOGY

## 2022-01-19 PROCEDURE — 4040F PNEUMOC VAC/ADMIN/RCVD: CPT | Performed by: OTOLARYNGOLOGY

## 2022-01-19 PROCEDURE — 3017F COLORECTAL CA SCREEN DOC REV: CPT | Performed by: OTOLARYNGOLOGY

## 2022-01-19 PROCEDURE — 1090F PRES/ABSN URINE INCON ASSESS: CPT | Performed by: OTOLARYNGOLOGY

## 2022-01-19 PROCEDURE — 99204 OFFICE O/P NEW MOD 45 MIN: CPT | Performed by: OTOLARYNGOLOGY

## 2022-01-19 PROCEDURE — G8417 CALC BMI ABV UP PARAM F/U: HCPCS | Performed by: OTOLARYNGOLOGY

## 2022-01-19 PROCEDURE — G8400 PT W/DXA NO RESULTS DOC: HCPCS | Performed by: OTOLARYNGOLOGY

## 2022-01-19 PROCEDURE — G8427 DOCREV CUR MEDS BY ELIG CLIN: HCPCS | Performed by: OTOLARYNGOLOGY

## 2022-01-19 PROCEDURE — 1123F ACP DISCUSS/DSCN MKR DOCD: CPT | Performed by: OTOLARYNGOLOGY

## 2022-01-19 PROCEDURE — G8482 FLU IMMUNIZE ORDER/ADMIN: HCPCS | Performed by: OTOLARYNGOLOGY

## 2022-01-19 RX ORDER — MINOCYCLINE HYDROCHLORIDE 100 MG/1
100 CAPSULE ORAL 2 TIMES DAILY
Qty: 20 CAPSULE | Refills: 0 | Status: SHIPPED | OUTPATIENT
Start: 2022-01-19 | End: 2022-01-29

## 2022-01-19 NOTE — PROGRESS NOTES
CHIEF COMPLAINT: Hearing loss. HISTORY OF PRESENT ILLNESS:  73 y.o. female who presents with concern of hearing loss in both ears. Insidious onset. No tinnitus. No vertigo. No ear fullness. Negative family history for hearing loss. In addition, the patient has a lesion of the lower lip which has been present for 4 to 6 weeks. It does not scab. It does not bleed. Stable in size. PAST MEDICAL HISTORY:   Social History     Tobacco Use   Smoking Status Never Smoker   Smokeless Tobacco Never Used                                                    Social History     Substance and Sexual Activity   Alcohol Use Yes    Alcohol/week: 2.0 standard drinks    Types: 1 Standard drinks or equivalent, 1 Glasses of wine per week                                                    Current Outpatient Medications:     losartan-hydroCHLOROthiazide (HYZAAR) 50-12.5 MG per tablet, 1 PO DAILY, Disp: 90 tablet, Rfl: 3    escitalopram (LEXAPRO) 10 MG tablet, TAKE ONE TABLET BY MOUTH DAILY, Disp: 90 tablet, Rfl: 2    vitamin D (CHOLECALCIFEROL) 1000 UNIT TABS tablet, Take 1,000 Units by mouth daily, Disp: , Rfl:     calcium carbonate (OSCAL) 500 MG TABS tablet, Take 500 mg by mouth daily, Disp: , Rfl:     ascorbic acid (VITAMIN C) 500 MG tablet, Take 1,000 mg by mouth daily, Disp: , Rfl:                                                  Past Medical History:   Diagnosis Date    Arthritis     CTS (carpal tunnel syndrome) 5/29/2014    Depression 1/31/2013    EKG abnormalities 08/30/2016    PVC's    HTN (hypertension) 1/31/2013    Hyperglycemia 1/5/2018    Premature ovarian failure 5/29/2014    Sleep apnea 01/31/2013    uses CPAP                                                    Past Surgical History:   Procedure Laterality Date    CARPAL TUNNEL RELEASE Right     COLONOSCOPY      FINGER SURGERY Left     index     FAMILY HISTORY: Family history reviewed.   Except as noted in history of present illness, there is no pertinent family history      REVIEW OF SYSTEMS:  All pertinent positive and negative review of systems included in HPI. Otherwise, all systems are reviewed and negative. PHYSICAL EXAMINATION:   GENERAL: wdwn- no acute distress  RESPIRATORY:  No stridor or respiratory distress  COMMUNICATION :  Normal voice  MENTAL STATUS:  Mood and affect normal, oriented X 3  HEAD AND FACE:  No abnormalities of the skin of face or head  EXTERNAL EARS AND NOSE:  Normal pinnae bilateral  FACIAL MUSCLES:  All branches of facial nerve intact  EXTRAOCULAR MUSCLES: Intact with full range of motion  FACE PALPATION:  No tenderness over sinuses. Zygomatic arches and orbital rims intact  OTOSCOPY:  Normal external auditory canals, tympanic membranes, and middle ear spaces  TUNING FORKS: Rinne ++ Campos midline at 512 Hz  INTRANASAL:  Septum midline, turbinates normal, meati clear. LIPS, TEETH, GINGIVA: Lesion of the lower lip in the midline, 1 cm. Soft to palpation. PHARYNX:  Normal  NECK:  No masses. LYMPHATIC:  No cervical adenopathy  SALIVARY GLANDS:  No swelling or masses in the parotid or submandibular salivary glands  THYROID:  No goiter or thyroid masses. AUDIOGRAM & TYMPANOGRAM REVIEWED: Mild sensorineural hearing loss with an asymmetry, right worse than the left. Auditory discrimination is essentially symmetrical.  Middle ear dynamics are normal.  IMPRESSION: Lesion of the lip, asymmetric sensorineural hearing loss right. PLAN: Discussed we will treat the lesion of the lip is a viral ulcer with minocycline applied topically twice daily. Discussed the asymmetry of hearing and the remote possibility that this is caused by an acoustic neuroma. Discussed imaging the internal auditory canals. FOLLOW-UP: Patient will follow-up to let me know the effectiveness of the minocycline in resolving her lip ulcer. She will follow-up in the near future for imaging of the internal auditory canals.

## 2022-01-21 ENCOUNTER — HOSPITAL ENCOUNTER (OUTPATIENT)
Age: 67
Setting detail: OUTPATIENT SURGERY
Discharge: HOME | End: 2022-01-21
Attending: SURGERY | Admitting: SURGERY
Payer: MEDICARE

## 2022-01-21 VITALS
WEIGHT: 182 LBS | BODY MASS INDEX: 33.49 KG/M2 | HEIGHT: 62 IN | DIASTOLIC BLOOD PRESSURE: 62 MMHG | HEART RATE: 61 BPM | TEMPERATURE: 97.6 F | OXYGEN SATURATION: 96 % | SYSTOLIC BLOOD PRESSURE: 132 MMHG | RESPIRATION RATE: 16 BRPM

## 2022-01-21 DIAGNOSIS — L98.9 SCALP LESION: ICD-10-CM

## 2022-01-21 PROCEDURE — A4217 STERILE WATER/SALINE, 500 ML: HCPCS | Performed by: SURGERY

## 2022-01-21 PROCEDURE — 2709999900 HC NON-CHARGEABLE SUPPLY: Performed by: SURGERY

## 2022-01-21 PROCEDURE — 3600000013 HC SURGERY LEVEL 3 ADDTL 15MIN: Performed by: SURGERY

## 2022-01-21 PROCEDURE — 2580000003 HC RX 258: Performed by: SURGERY

## 2022-01-21 PROCEDURE — 88304 TISSUE EXAM BY PATHOLOGIST: CPT

## 2022-01-21 PROCEDURE — 7100000010 HC PHASE II RECOVERY - FIRST 15 MIN: Performed by: SURGERY

## 2022-01-21 PROCEDURE — 11422 EXC H-F-NK-SP B9+MARG 1.1-2: CPT | Performed by: SURGERY

## 2022-01-21 PROCEDURE — 2500000003 HC RX 250 WO HCPCS: Performed by: SURGERY

## 2022-01-21 PROCEDURE — 13120 CMPLX RPR S/A/L 1.1-2.5 CM: CPT | Performed by: SURGERY

## 2022-01-21 PROCEDURE — 3600000003 HC SURGERY LEVEL 3 BASE: Performed by: SURGERY

## 2022-01-21 PROCEDURE — 7100000011 HC PHASE II RECOVERY - ADDTL 15 MIN: Performed by: SURGERY

## 2022-01-21 RX ORDER — MAGNESIUM HYDROXIDE 1200 MG/15ML
LIQUID ORAL CONTINUOUS PRN
Status: COMPLETED | OUTPATIENT
Start: 2022-01-21 | End: 2022-01-21

## 2022-01-21 ASSESSMENT — PAIN - FUNCTIONAL ASSESSMENT: PAIN_FUNCTIONAL_ASSESSMENT: 0-10

## 2022-01-21 ASSESSMENT — PAIN SCALES - GENERAL: PAINLEVEL_OUTOF10: 0

## 2022-01-21 NOTE — OP NOTE
[facility]     OPERATIVE DICTATION    NAME: Leighann Almodovar   MRN: [id number]  DATE: 1/21/2022    AGE: 73 y.o. LOCATION: [facility]    PREOPERATIVE DIAGNOSIS:  Scalp lesion     POSTOPERATIVE DIAGNOSIS:  Same. OPERATION PERFORMED: 1) Excision of scalp lesion (2 x 1.6 cm)       2) Complex closure (2.3 cm)     SURGEON:  Yahir Alfonso MD     ANESTHESIA:  Local     ESTIMATED BLOOD LOSS:  Minimal     DRAINS:  None     SPECIMENS: Skin lesion     OPERATIVE INDICATIONS:  This is a 73 y.o. female who presented to the office in consultation for a painful scalp lesion. The patient desired excision and a thorough discussion regarding the risks, benefits, alternatives, outcomes, and personnel involved was performed with the patient. After all questions were answered to the patient's satisfaction, they agreed to proceed. OPERATIVE PROCEDURE:  The patient was marked in the preoperative holding area and then brought to the operating room on the eye stretcher. The patient was prepped and draped in the usual sterile manner. A time-out was performed confirming the patient and the procedure to be performed. The operation commenced by infiltration of local using a 50:50 mixture of 1% lidocaine with epinephrine and 0.5% marcaine plain. An excision was then performed removing the lesion circumferentially in an elliptical manner. The lesion was then oriented and sent to pathology after being removed using a 15 blade. Hemostasis was obtained with electrocautery. The wound was then closed by widely undermining the periphery to allow for a tension free closure. The skin was then closed in layers using 3-0 Monocryl and 4-0 Prolene sutures. A sterile dressing was then applied. There were no immediate complications and the patient tolerated the procedure well. At the end of the case, all counts were correct.     Christel Hernandez MD

## 2022-01-21 NOTE — H&P
Update History & Physical    The patient's History and Physical of January 12, 2022 was reviewed with the patient and I examined the patient. There was no change. The surgical site was confirmed by the patient and me. Plan: The risks, benefits, expected outcome, and alternative to the recommended procedure have been discussed with the patient. Patient understands and wants to proceed with the procedure.      Electronically signed by Sole Peres MD on 1/21/2022 at 9:29 AM

## 2022-01-21 NOTE — PROGRESS NOTES
Discharge instructions reviewed with patient. Discharge instructions signed and copy given with no additional questions. Patient  discharged home with belongings. Discharged in no distress. Assessment unchanged. Patient denies pain.

## 2022-01-25 ENCOUNTER — PATIENT MESSAGE (OUTPATIENT)
Dept: SURGERY | Age: 67
End: 2022-01-25

## 2022-01-25 NOTE — TELEPHONE ENCOUNTER
From: Valarie Patricio  To: Dr. Jordi Llamas  Sent: 1/25/2022 11:00 AM EST  Subject: Pilar cyst    Dr. Osiel Daniels,  I read the pathology result on my chart from the procedure last Friday, 1/21/22. It states that the lesion removed was a pilar cyst - as expected. It does not note benign or malignant. I have read that typically a pilar cyst is benign - but would appreciate full confirmation of that understanding.    Thanks,  Marycruz Porter

## 2022-02-02 ENCOUNTER — OFFICE VISIT (OUTPATIENT)
Dept: SURGERY | Age: 67
End: 2022-02-02

## 2022-02-02 VITALS
WEIGHT: 182 LBS | BODY MASS INDEX: 33.49 KG/M2 | DIASTOLIC BLOOD PRESSURE: 75 MMHG | OXYGEN SATURATION: 98 % | HEIGHT: 62 IN | SYSTOLIC BLOOD PRESSURE: 132 MMHG | HEART RATE: 61 BPM

## 2022-02-02 DIAGNOSIS — Z09 POSTOP CHECK: Primary | ICD-10-CM

## 2022-02-02 PROCEDURE — 99024 POSTOP FOLLOW-UP VISIT: CPT | Performed by: SURGERY

## 2022-03-29 ENCOUNTER — TELEPHONE (OUTPATIENT)
Dept: ENT CLINIC | Age: 67
End: 2022-03-29

## 2022-03-29 DIAGNOSIS — H90.A21 SENSORINEURAL HEARING LOSS (SNHL) OF RIGHT EAR WITH RESTRICTED HEARING OF LEFT EAR: Primary | ICD-10-CM

## 2022-03-29 NOTE — TELEPHONE ENCOUNTER
Patient called today  She is ready for for the scan\" dr Chris Ortega Did not say what scan specifically  This is from 1/22 visit

## 2022-03-30 ENCOUNTER — HOSPITAL ENCOUNTER (OUTPATIENT)
Dept: MRI IMAGING | Age: 67
Discharge: HOME | End: 2022-03-30
Payer: MEDICARE

## 2022-03-30 DIAGNOSIS — H90.A21 SENSORINEURAL HEARING LOSS (SNHL) OF RIGHT EAR WITH RESTRICTED HEARING OF LEFT EAR: ICD-10-CM

## 2022-03-30 PROCEDURE — 70553 MRI BRAIN STEM W/O & W/DYE: CPT

## 2022-03-30 PROCEDURE — A9579 GAD-BASE MR CONTRAST NOS,1ML: HCPCS | Performed by: OTOLARYNGOLOGY

## 2022-03-30 PROCEDURE — 6360000004 HC RX CONTRAST MEDICATION: Performed by: OTOLARYNGOLOGY

## 2022-03-30 RX ADMIN — GADOTERIDOL 18 ML: 279.3 INJECTION, SOLUTION INTRAVENOUS at 11:41

## 2022-04-12 ENCOUNTER — TELEMEDICINE (OUTPATIENT)
Dept: INTERNAL MEDICINE CLINIC | Age: 67
End: 2022-04-12
Payer: MEDICARE

## 2022-04-12 ENCOUNTER — TELEPHONE (OUTPATIENT)
Dept: INTERNAL MEDICINE CLINIC | Age: 67
End: 2022-04-12

## 2022-04-12 DIAGNOSIS — J06.9 UPPER RESPIRATORY TRACT INFECTION, UNSPECIFIED TYPE: Primary | ICD-10-CM

## 2022-04-12 PROCEDURE — 4040F PNEUMOC VAC/ADMIN/RCVD: CPT | Performed by: INTERNAL MEDICINE

## 2022-04-12 PROCEDURE — 1123F ACP DISCUSS/DSCN MKR DOCD: CPT | Performed by: INTERNAL MEDICINE

## 2022-04-12 PROCEDURE — 99213 OFFICE O/P EST LOW 20 MIN: CPT | Performed by: INTERNAL MEDICINE

## 2022-04-12 PROCEDURE — 1090F PRES/ABSN URINE INCON ASSESS: CPT | Performed by: INTERNAL MEDICINE

## 2022-04-12 PROCEDURE — G8400 PT W/DXA NO RESULTS DOC: HCPCS | Performed by: INTERNAL MEDICINE

## 2022-04-12 PROCEDURE — 3017F COLORECTAL CA SCREEN DOC REV: CPT | Performed by: INTERNAL MEDICINE

## 2022-04-12 PROCEDURE — G8427 DOCREV CUR MEDS BY ELIG CLIN: HCPCS | Performed by: INTERNAL MEDICINE

## 2022-04-12 RX ORDER — AZITHROMYCIN 250 MG/1
250 TABLET, FILM COATED ORAL SEE ADMIN INSTRUCTIONS
Qty: 6 TABLET | Refills: 0 | Status: SHIPPED | OUTPATIENT
Start: 2022-04-12 | End: 2022-04-17

## 2022-04-12 RX ORDER — PROMETHAZINE HYDROCHLORIDE AND PHENYLEPHRINE HYDROCHLORIDE 6.25; 5 MG/5ML; MG/5ML
5 SYRUP ORAL EVERY 6 HOURS
Qty: 280 ML | Refills: 0 | Status: SHIPPED | OUTPATIENT
Start: 2022-04-12 | End: 2022-09-08

## 2022-04-12 ASSESSMENT — PATIENT HEALTH QUESTIONNAIRE - PHQ9
2. FEELING DOWN, DEPRESSED OR HOPELESS: 0
1. LITTLE INTEREST OR PLEASURE IN DOING THINGS: 0
4. FEELING TIRED OR HAVING LITTLE ENERGY: 0
10. IF YOU CHECKED OFF ANY PROBLEMS, HOW DIFFICULT HAVE THESE PROBLEMS MADE IT FOR YOU TO DO YOUR WORK, TAKE CARE OF THINGS AT HOME, OR GET ALONG WITH OTHER PEOPLE: 0
5. POOR APPETITE OR OVEREATING: 3
SUM OF ALL RESPONSES TO PHQ QUESTIONS 1-9: 3
SUM OF ALL RESPONSES TO PHQ QUESTIONS 1-9: 3
8. MOVING OR SPEAKING SO SLOWLY THAT OTHER PEOPLE COULD HAVE NOTICED. OR THE OPPOSITE, BEING SO FIGETY OR RESTLESS THAT YOU HAVE BEEN MOVING AROUND A LOT MORE THAN USUAL: 0
6. FEELING BAD ABOUT YOURSELF - OR THAT YOU ARE A FAILURE OR HAVE LET YOURSELF OR YOUR FAMILY DOWN: 0
SUM OF ALL RESPONSES TO PHQ QUESTIONS 1-9: 3
9. THOUGHTS THAT YOU WOULD BE BETTER OFF DEAD, OR OF HURTING YOURSELF: 0
3. TROUBLE FALLING OR STAYING ASLEEP: 0
7. TROUBLE CONCENTRATING ON THINGS, SUCH AS READING THE NEWSPAPER OR WATCHING TELEVISION: 0
SUM OF ALL RESPONSES TO PHQ QUESTIONS 1-9: 3
SUM OF ALL RESPONSES TO PHQ9 QUESTIONS 1 & 2: 0

## 2022-04-12 NOTE — PROGRESS NOTES
2022    TELEHEALTH EVALUATION -- Audio/Visual (During KQXMS-23 public health emergency)    HPI: Symptoms of laryngitis x1 week    Emily Unger (:  1955) has requested an audio/video evaluation for the following concern(s):    Complains of URI symptoms with laryngitis x1 week denies any fever chills has a slight nonproductive cough Covid test was negative    Review of Systems no fevers chills shortness of breath    Prior to Visit Medications    Medication Sig Taking? Authorizing Provider   azithromycin (ZITHROMAX) 250 MG tablet Take 1 tablet by mouth See Admin Instructions for 5 days 500mg on day 1 followed by 250mg on days 2 - 5 Yes Ollie Cockayne, MD   Promethazine-Phenylephrine [facility]) 6.25-5 MG/5ML syrup Take 5 mLs by mouth every 6 hours Yes Ollie Cockayne, MD   losartan-hydroCHLOROthiazide (HYZAAR) 50-12.5 MG per tablet 1 PO DAILY Yes Ollie Cockayne, MD   escitalopram (LEXAPRO) 10 MG tablet TAKE ONE TABLET BY MOUTH DAILY Yes Ollie Cockayne, MD   vitamin D (CHOLECALCIFEROL) 1000 UNIT TABS tablet Take 1,000 Units by mouth daily Yes Historical Provider, MD   calcium carbonate (OSCAL) 500 MG TABS tablet Take 500 mg by mouth daily Yes Historical Provider, MD   ascorbic acid (VITAMIN C) 500 MG tablet Take 1,000 mg by mouth daily Yes Historical Provider, MD       Social History     Tobacco Use    Smoking status: Never Smoker    Smokeless tobacco: Never Used   Vaping Use    Vaping Use: Never used   Substance Use Topics    Alcohol use:  Yes     Alcohol/week: 2.0 standard drinks     Types: 1 Standard drinks or equivalent, 1 Glasses of wine per week    Drug use: Never        Past Medical History:   Diagnosis Date    Arthritis     CTS (carpal tunnel syndrome) 2014    Depression 2013    EKG abnormalities 2016    PVC's    Headache     Hearing loss     HTN (hypertension) 2013    Hyperglycemia 2018    Premature ovarian failure 2014    Scalp lesion     Sleep apnea 2013 uses CPAP    TMJ dysfunction        PHYSICAL EXAMINATION:  [ INSTRUCTIONS:  \"[x]\" Indicates a positive item  \"[]\" Indicates a negative item  -- DELETE ALL ITEMS NOT EXAMINED]  Vital Signs: (As obtained by patient/caregiver or practitioner observation)    Blood pressure-  Heart rate-    Respiratory rate-    Temperature-  Pulse oximetry-     Constitutional: [x] Appears well-developed and well-nourished [x] No apparent distress      [] Abnormal-   Mental status  [] Alert and awake  [] Oriented to person/place/time []Able to follow commands      Eyes:  EOM    []  Normal  [] Abnormal-  Sclera  []  Normal  [] Abnormal -         Discharge []  None visible  [] Abnormal -    HENT:   [] Normocephalic, atraumatic. [] Abnormal   [] Mouth/Throat: Mucous membranes are moist.     External Ears [] Normal  [] Abnormal-     Neck: [] No visualized mass     Pulmonary/Chest: [x] Respiratory effort normal.  [x] No visualized signs of difficulty breathing or respiratory distress        [] Abnormal-      Musculoskeletal:   [] Normal gait with no signs of ataxia         [] Normal range of motion of neck        [] Abnormal-       Neurological:        [] No Facial Asymmetry (Cranial nerve 7 motor function) (limited exam to video visit)          [] No gaze palsy        [] Abnormal-         Skin:        [] No significant exanthematous lesions or discoloration noted on facial skin         [] Abnormal-            Psychiatric:       [] Normal Affect [] No Hallucinations        [] Abnormal-     Other pertinent observable physical exam findings-     ASSESSMENT/PLAN:  1. Upper respiratory tract infection, unspecified type  Trial of azithromycin Z-Indio and Phenergan VC call if symptoms not resolve  - azithromycin (ZITHROMAX) 250 MG tablet; Take 1 tablet by mouth See Admin Instructions for 5 days 500mg on day 1 followed by 250mg on days 2 - 5  Dispense: 6 tablet; Refill: 0      No follow-ups on file.     Andrade Evangelista, was evaluated through a synchronous (real-time) audio-video encounter. The patient (or guardian if applicable) is aware that this is a billable service, which includes applicable co-pays. This Virtual Visit was conducted with patient's (and/or legal guardian's) consent. The visit was conducted pursuant to the emergency declaration under the [address] [place] [address], [address] [place] [address] authority and the [employer] General Act. Patient identification was verified, and a caregiver was present when appropriate. The patient was located at home in a state where the provider was licensed to provide care. Total time spent on this encounter: Not billed by time    --Kvng Sanchez MD on 4/12/2022 at 4:45 PM    An electronic signature was used to authenticate this note.

## 2022-06-13 RX ORDER — ESCITALOPRAM OXALATE 10 MG/1
TABLET ORAL
Qty: 90 TABLET | Refills: 2 | Status: SHIPPED | OUTPATIENT
Start: 2022-06-13 | End: 2022-09-08 | Stop reason: SDUPTHER

## 2022-07-13 ENCOUNTER — PATIENT MESSAGE (OUTPATIENT)
Dept: INTERNAL MEDICINE CLINIC | Age: 67
End: 2022-07-13

## 2022-07-13 RX ORDER — LOSARTAN POTASSIUM AND HYDROCHLOROTHIAZIDE 12.5; 5 MG/1; MG/1
TABLET ORAL
Qty: 90 TABLET | Refills: 0 | Status: SHIPPED | OUTPATIENT
Start: 2022-07-13 | End: 2022-09-08 | Stop reason: SDUPTHER

## 2022-07-13 NOTE — TELEPHONE ENCOUNTER
From: David Rhoades  To: Dr. Daina Ventura: 7/13/2022 7:37 AM EDT  Subject: Prescription renewal - to different pharmacy    We are spending the summer in [place], [address] [facility] and my Losartan - HCTZ prescription is due for renewal. Can a new \"one time 90 day prescription\" be sent to [facility] at [address]. [place], [address]  [phone], please? This will cover me til I return back to [place] late August.     I would also like to schedule an annual appointment sometime between 9/1 - 9/15. I realize that I was in last year on 9/30 - but since my last visit my insurance changed to medicare.     Thank you,  Maciel Manning

## 2022-09-01 SDOH — HEALTH STABILITY: PHYSICAL HEALTH: ON AVERAGE, HOW MANY DAYS PER WEEK DO YOU ENGAGE IN MODERATE TO STRENUOUS EXERCISE (LIKE A BRISK WALK)?: 5 DAYS

## 2022-09-01 SDOH — HEALTH STABILITY: PHYSICAL HEALTH: ON AVERAGE, HOW MANY MINUTES DO YOU ENGAGE IN EXERCISE AT THIS LEVEL?: 80 MIN

## 2022-09-01 ASSESSMENT — PATIENT HEALTH QUESTIONNAIRE - PHQ9
2. FEELING DOWN, DEPRESSED OR HOPELESS: 0
SUM OF ALL RESPONSES TO PHQ9 QUESTIONS 1 & 2: 0
SUM OF ALL RESPONSES TO PHQ QUESTIONS 1-9: 0
SUM OF ALL RESPONSES TO PHQ QUESTIONS 1-9: 0
1. LITTLE INTEREST OR PLEASURE IN DOING THINGS: 0
SUM OF ALL RESPONSES TO PHQ QUESTIONS 1-9: 0
SUM OF ALL RESPONSES TO PHQ QUESTIONS 1-9: 0

## 2022-09-01 ASSESSMENT — LIFESTYLE VARIABLES
HOW MANY STANDARD DRINKS CONTAINING ALCOHOL DO YOU HAVE ON A TYPICAL DAY: 1 OR 2
HOW OFTEN DO YOU HAVE A DRINK CONTAINING ALCOHOL: 2-4 TIMES A MONTH
HOW MANY STANDARD DRINKS CONTAINING ALCOHOL DO YOU HAVE ON A TYPICAL DAY: 1
HOW OFTEN DO YOU HAVE SIX OR MORE DRINKS ON ONE OCCASION: 1
HOW OFTEN DO YOU HAVE A DRINK CONTAINING ALCOHOL: 3

## 2022-09-08 ENCOUNTER — OFFICE VISIT (OUTPATIENT)
Dept: INTERNAL MEDICINE CLINIC | Age: 67
End: 2022-09-08
Payer: MEDICARE

## 2022-09-08 VITALS
TEMPERATURE: 97.8 F | BODY MASS INDEX: 33.11 KG/M2 | DIASTOLIC BLOOD PRESSURE: 70 MMHG | SYSTOLIC BLOOD PRESSURE: 134 MMHG | OXYGEN SATURATION: 99 % | WEIGHT: 181 LBS | HEART RATE: 74 BPM

## 2022-09-08 DIAGNOSIS — J32.9 CHRONIC SINUSITIS, UNSPECIFIED LOCATION: ICD-10-CM

## 2022-09-08 DIAGNOSIS — R73.9 HYPERGLYCEMIA: ICD-10-CM

## 2022-09-08 DIAGNOSIS — F32.A DEPRESSION, UNSPECIFIED DEPRESSION TYPE: ICD-10-CM

## 2022-09-08 DIAGNOSIS — G47.33 OBSTRUCTIVE SLEEP APNEA SYNDROME: ICD-10-CM

## 2022-09-08 DIAGNOSIS — Z00.00 PE (PHYSICAL EXAM), ANNUAL: Primary | ICD-10-CM

## 2022-09-08 DIAGNOSIS — E78.5 HYPERLIPIDEMIA, UNSPECIFIED HYPERLIPIDEMIA TYPE: ICD-10-CM

## 2022-09-08 DIAGNOSIS — I10 ESSENTIAL HYPERTENSION: ICD-10-CM

## 2022-09-08 DIAGNOSIS — Z23 NEED FOR PNEUMOCOCCAL VACCINATION: ICD-10-CM

## 2022-09-08 PROCEDURE — 90677 PCV20 VACCINE IM: CPT | Performed by: INTERNAL MEDICINE

## 2022-09-08 PROCEDURE — 1123F ACP DISCUSS/DSCN MKR DOCD: CPT | Performed by: INTERNAL MEDICINE

## 2022-09-08 PROCEDURE — 3017F COLORECTAL CA SCREEN DOC REV: CPT | Performed by: INTERNAL MEDICINE

## 2022-09-08 PROCEDURE — G0402 INITIAL PREVENTIVE EXAM: HCPCS | Performed by: INTERNAL MEDICINE

## 2022-09-08 PROCEDURE — G0009 ADMIN PNEUMOCOCCAL VACCINE: HCPCS | Performed by: INTERNAL MEDICINE

## 2022-09-08 RX ORDER — ESCITALOPRAM OXALATE 10 MG/1
TABLET ORAL
Qty: 90 TABLET | Refills: 2 | Status: SHIPPED | OUTPATIENT
Start: 2022-09-08

## 2022-09-08 RX ORDER — METHYLPREDNISOLONE 4 MG/1
TABLET ORAL
Qty: 1 KIT | Refills: 0 | Status: SHIPPED | OUTPATIENT
Start: 2022-09-08 | End: 2022-09-14

## 2022-09-08 RX ORDER — AZITHROMYCIN 250 MG/1
250 TABLET, FILM COATED ORAL SEE ADMIN INSTRUCTIONS
Qty: 6 TABLET | Refills: 0 | Status: SHIPPED | OUTPATIENT
Start: 2022-09-08 | End: 2022-09-13

## 2022-09-08 RX ORDER — LOSARTAN POTASSIUM AND HYDROCHLOROTHIAZIDE 12.5; 5 MG/1; MG/1
TABLET ORAL
Qty: 90 TABLET | Refills: 3 | Status: SHIPPED | OUTPATIENT
Start: 2022-09-08 | End: 2022-10-06

## 2022-09-08 NOTE — PROGRESS NOTES
Medicare Annual Wellness Visit    [facility] is here for Medicare AWV       No follow-ups on file. Subjective   The following acute and/or chronic problems were also addressed today:      Patient's complete Health Risk Assessment and screening values have been reviewed and are found in Flowsheets. The following problems were reviewed today and where indicated follow up appointments were made and/or referrals ordered.     Positive Risk Factor Screenings with Interventions:    Fall Risk:  Do you feel unsteady or are you worried about falling? : no  2 or more falls in past year?: (!) yes  Fall with injury in past year?: (!) yes   Fall Risk Interventions:    Home safety tips provided            General Health and ACP:  General  In general, how would you say your health is?: Very Good  In the past 7 days, have you experienced any of the following: New or Increased Pain, New or Increased Fatigue, Loneliness, Social Isolation, Stress or Anger?: No  Do you get the social and emotional support that you need?: Yes  Do you have a Living Will?: Yes    Advance Directives       Power of  Living Will ACP-Advance Directive ACP-Power of     Not on File Not on File Not on File Not on File        General Health Risk Interventions:  Poor self-assessment of health status: patient declines any further evaluation/treatment for this issue    Health Habits/Nutrition:  Physical Activity: Sufficiently Active    Days of Exercise per Week: 5 days    Minutes of Exercise per Session: 80 min     Have you lost any weight without trying in the past 3 months?: No     Have you seen the dentist within the past year?: Yes  Health Habits/Nutrition Interventions:  Inadequate physical activity:  patient agrees to exercise for at least 150 minutes/week    Hearing/Vision:  Do you or your family notice any trouble with your hearing that hasn't been managed with hearing aids?: (!) Yes  Do you have difficulty driving, watching TV, or doing any of your daily activities because of your eyesight?: (!) Yes  Have you had an eye exam within the past year?: Yes  No results found. Hearing/Vision Interventions:  Hearing concerns:  patient declines any further evaluation/treatment for hearing issues    Safety:  Do you have working smoke detectors?: Yes  Do you have any tripping hazards - loose or unsecured carpets or rugs?: No  Do you have any tripping hazards - clutter in doorways, halls, or stairs?: No  Do you have either shower bars, grab bars, non-slip mats or non-slip surfaces in your shower or bathtub?: (!) No  Do all of your stairways have a railing or banister?: Yes  Do you always fasten your seatbelt when you are in a car?: Yes  Safety Interventions:  Home safety tips provided           Objective   Vitals:    09/08/22 1352   BP: 134/70   Pulse: 74   Temp: 97.8 °F (36.6 °C)   SpO2: 99%   Weight: 181 lb (82.1 kg)      Body mass index is 33.11 kg/m².       General Appearance: alert and oriented to person, place and time, well developed and well- nourished, in no acute distress  Skin: warm and dry, no rash or erythema  Head: normocephalic and atraumatic  Eyes: pupils equal, round, and reactive to light, extraocular eye movements intact, conjunctivae normal  ENT: tympanic membrane, external ear and ear canal normal bilaterally, nose without deformity, nasal mucosa and turbinates normal without polyps  Neck: supple and non-tender without mass, no thyromegaly or thyroid nodules, no cervical lymphadenopathy  Pulmonary/Chest: clear to auscultation bilaterally- no wheezes, rales or rhonchi, normal air movement, no respiratory distress  Cardiovascular: normal rate, regular rhythm, normal S1 and S2, no murmurs, rubs, clicks, or gallops, distal pulses intact, no carotid bruits  Abdomen: soft, non-tender, non-distended, normal bowel sounds, no masses or organomegaly  Extremities: no cyanosis, clubbing or edema  Musculoskeletal: normal range of motion, no joint swelling, deformity or tenderness  Neurologic: reflexes normal and symmetric, no cranial nerve deficit, gait, coordination and speech normal       Allergies   Allergen Reactions    Bee Venom     Food      eggplant    Pcn [Penicillins] Hives    Penicillins      Prior to Visit Medications    Medication Sig Taking?  Authorizing Provider   losartan-hydroCHLOROthiazide (HYZAAR) 50-12.5 MG per tablet 1 PO DAILY  Elisabeth Squires MD   escitalopram (LEXAPRO) 10 MG tablet TAKE ONE TABLET BY MOUTH DAILY  Elisabeth Squires MD   vitamin D (CHOLECALCIFEROL) 1000 UNIT TABS tablet Take 1,000 Units by mouth daily  Historical Provider, MD   calcium carbonate (OSCAL) 500 MG TABS tablet Take 500 mg by mouth daily  Historical Provider, MD   ascorbic acid (VITAMIN C) 500 MG tablet Take 1,000 mg by mouth daily  Historical Provider, MD Nunez (Including outside providers/suppliers regularly involved in providing care):   Patient Care Team:  Elisabeth Squires MD as PCP - General (Internal Medicine)  Elisabeth Squires MD as PCP - [facility] Empaneled Provider  Elisabeth Squires MD (Internal Medicine)     Reviewed and updated this visit:  Meds

## 2022-09-08 NOTE — PROGRESS NOTES
Annual Wellness Visit     Patient:  Deion Ennis                                               : 1955  Age: 73 y.o. MRN: [id number]  Date : 2022      CHIEF COMPLAINT: Deion Ennis is a 73 y.o. female who presents for : Physical exam    1. Essential hypertension  Problem is stable    2. Hyperlipidemia, unspecified hyperlipidemia type  No complaints no myalgias    3. Obstructive sleep apnea syndrome  Uses CPAP at night    4. Depression, unspecified depression type  Problem is stable on present meds she is under some stress as result of the illness of her daughter    11. Hyperglycemia  No polyuria polydipsia polyphagia    6. PE (physical exam), annual  Generally feels okay does have some chronic sinus congestion over the last 6 months with some yellow rhinorrhea she also notes being arthritic type pain but denies any chest pain shortness of breath or any other  - Vitamin D 25 Hydroxy; Future  - HIV Screen; Future  - DEXA BONE DENSITY 2 SITES; Future  - CBC with Auto Differential; Future  - Comprehensive Metabolic Panel; Future  - Lipid Panel; Future  - TSH; Future  - Urinalysis; Future  - Vitamin D 25 Hydroxy; Future  - azithromycin (ZITHROMAX) 250 MG tablet; Take 1 tablet by mouth See Admin Instructions for 5 days 500mg on day 1 followed by 250mg on days 2 - 5  Dispense: 6 tablet; Refill: 0    7. Chronic sinusitis, unspecified location  As above  - azithromycin (ZITHROMAX) 250 MG tablet; Take 1 tablet by mouth See Admin Instructions for 5 days 500mg on day 1 followed by 250mg on days 2 - 5  Dispense: 6 tablet;  Refill: 0        Patient Active Problem List    Diagnosis Date Noted    Scalp lesion     Myxoid cyst 2018    Hyperglycemia 2018    Essential hypertension 2017    EKG abnormalities 2016    CTS (carpal tunnel syndrome) 2014    Premature ovarian failure 2014    Depression 2013    Sleep apnea 2013    Hyperlipidemia 2013       Constitutional: deformities. Back- no tenderness  Integument:  Well hydrated, no rash   Lymphatic:  No lymphadenopathy noted   Neurologic:  Alert & oriented x 3, CN 2-12 normal, normal motor function, normal sensory function, no focal deficits noted   Psychiatric:  Speech and behavior appropriate       Vitals: /70   Pulse 74   Temp 97.8 °F (36.6 °C)   Wt 181 lb (82.1 kg)   SpO2 99%   BMI 33.11 kg/m²     Body mass index is 33.11 kg/m². Wt Readings from Last 3 Encounters:   09/08/22 181 lb (82.1 kg)   02/02/22 182 lb (82.6 kg)   01/18/22 182 lb (82.6 kg)         LABS:    CBC:   Lab Results   Component Value Date    WBC 7.6 09/30/2021    HGB 13.8 09/30/2021    HCT 41.5 09/30/2021    MCV 90.1 09/30/2021     09/30/2021         No results found for: IRON, TIBC, FERRITIN, FOLATE, [id number], PTH                                                          BMP:    Lab Results   Component Value Date     09/30/2021    K 4.3 09/30/2021     09/30/2021    CO2 26 09/30/2021       LFT's:   Lab Results   Component Value Date    ALT 23 09/30/2021    AST 40 (H) 09/30/2021    GGT 27 05/29/2014    ALKPHOS 65 09/30/2021    BILITOT 0.4 09/30/2021       Lipids:   Lab Results   Component Value Date    CHOL 215 (H) 09/30/2021    HDL 53 09/30/2021    LDLCALC 129 (H) 09/30/2021    TRIG 167 (H) 09/30/2021       INR: No results found for: INR, PROTIME    U/A:  Lab Results   Component Value Date    LABMICR Not Indicated 09/30/2021          Lab Results   Component Value Date    LABA1C 6.1 04/15/2019        Lab Results   Component Value Date    CREATININE 0.8 09/30/2021       -----------------------------------------------------------------     Assessment/Plan:   1. Essential hypertension  This problem is stable continue present meds    2. Hyperlipidemia, unspecified hyperlipidemia type  Problem is stable check above labs continue diet and exercise    3. Obstructive sleep apnea syndrome  Problem is stable continue CPAP    4.  Depression, unspecified depression type  This problem is stable continue present meds slight exacerbation result of personal issues with family members    5. Hyperglycemia  Problem is stable check above labs continue diet and exercise    6. PE (physical exam), annual  Screening labs continue diet and exercise DEXA scan ordered she is also to get a Prevnar 20 and to get the COVID by Valent shot as well as flu shot  - Vitamin D 25 Hydroxy; Future  - HIV Screen; Future  - DEXA BONE DENSITY 2 SITES; Future  - CBC with Auto Differential; Future  - Comprehensive Metabolic Panel; Future  - Lipid Panel; Future  - TSH; Future  - Urinalysis; Future  - Vitamin D 25 Hydroxy; Future  - azithromycin (ZITHROMAX) 250 MG tablet; Take 1 tablet by mouth See Admin Instructions for 5 days 500mg on day 1 followed by 250mg on days 2 - 5  Dispense: 6 tablet; Refill: 0    7. Chronic sinusitis, unspecified location  Of Medrol Dosepak plus Z-Indio  - azithromycin (ZITHROMAX) 250 MG tablet; Take 1 tablet by mouth See Admin Instructions for 5 days 500mg on day 1 followed by 250mg on days 2 - 5  Dispense: 6 tablet;  Refill: 0

## 2022-09-12 DIAGNOSIS — Z00.00 PE (PHYSICAL EXAM), ANNUAL: ICD-10-CM

## 2022-09-12 LAB
A/G RATIO: 1.5 (ref 1.1–2.2)
ALBUMIN SERPL-MCNC: 4.3 G/DL (ref 3.4–5)
ALP BLD-CCNC: 76 U/L (ref 40–129)
ALT SERPL-CCNC: 22 U/L (ref 10–40)
ANION GAP SERPL CALCULATED.3IONS-SCNC: 12 MMOL/L (ref 3–16)
AST SERPL-CCNC: 25 U/L (ref 15–37)
BACTERIA: NORMAL /HPF
BASOPHILS ABSOLUTE: 0.1 K/UL (ref 0–0.2)
BASOPHILS RELATIVE PERCENT: 0.7 %
BILIRUB SERPL-MCNC: <0.2 MG/DL (ref 0–1)
BILIRUBIN URINE: NEGATIVE
BLOOD, URINE: NEGATIVE
BUN BLDV-MCNC: 20 MG/DL (ref 7–20)
CALCIUM SERPL-MCNC: 9.6 MG/DL (ref 8.3–10.6)
CHLORIDE BLD-SCNC: 101 MMOL/L (ref 99–110)
CHOLESTEROL, TOTAL: 224 MG/DL (ref 0–199)
CLARITY: CLEAR
CO2: 27 MMOL/L (ref 21–32)
COLOR: YELLOW
CREAT SERPL-MCNC: 0.9 MG/DL (ref 0.6–1.2)
EOSINOPHILS ABSOLUTE: 0 K/UL (ref 0–0.6)
EOSINOPHILS RELATIVE PERCENT: 0.3 %
EPITHELIAL CELLS, UA: 3 /HPF (ref 0–5)
GFR AFRICAN AMERICAN: >60
GFR NON-AFRICAN AMERICAN: >60
GLUCOSE BLD-MCNC: 131 MG/DL (ref 70–99)
GLUCOSE URINE: NEGATIVE MG/DL
HCT VFR BLD CALC: 39 % (ref 36–48)
HDLC SERPL-MCNC: 59 MG/DL (ref 40–60)
HEMOGLOBIN: 12.9 G/DL (ref 12–16)
HYALINE CASTS: 0 /LPF (ref 0–8)
KETONES, URINE: NEGATIVE MG/DL
LDL CHOLESTEROL CALCULATED: 138 MG/DL
LEUKOCYTE ESTERASE, URINE: ABNORMAL
LYMPHOCYTES ABSOLUTE: 1 K/UL (ref 1–5.1)
LYMPHOCYTES RELATIVE PERCENT: 10 %
MCH RBC QN AUTO: 29.5 PG (ref 26–34)
MCHC RBC AUTO-ENTMCNC: 33.1 G/DL (ref 31–36)
MCV RBC AUTO: 89.1 FL (ref 80–100)
MICROSCOPIC EXAMINATION: YES
MONOCYTES ABSOLUTE: 0.6 K/UL (ref 0–1.3)
MONOCYTES RELATIVE PERCENT: 6.2 %
NEUTROPHILS ABSOLUTE: 8.2 K/UL (ref 1.7–7.7)
NEUTROPHILS RELATIVE PERCENT: 82.8 %
NITRITE, URINE: NEGATIVE
PDW BLD-RTO: 13.5 % (ref 12.4–15.4)
PH UA: 6 (ref 5–8)
PLATELET # BLD: 314 K/UL (ref 135–450)
PMV BLD AUTO: 9.5 FL (ref 5–10.5)
POTASSIUM SERPL-SCNC: 4.4 MMOL/L (ref 3.5–5.1)
PROTEIN UA: NEGATIVE MG/DL
RBC # BLD: 4.38 M/UL (ref 4–5.2)
RBC UA: 2 /HPF (ref 0–4)
SODIUM BLD-SCNC: 140 MMOL/L (ref 136–145)
SPECIFIC GRAVITY UA: 1.02 (ref 1–1.03)
TOTAL PROTEIN: 7.1 G/DL (ref 6.4–8.2)
TRIGL SERPL-MCNC: 135 MG/DL (ref 0–150)
TSH SERPL DL<=0.05 MIU/L-ACNC: 1.23 UIU/ML (ref 0.27–4.2)
URINE TYPE: ABNORMAL
UROBILINOGEN, URINE: 0.2 E.U./DL
VITAMIN D 25-HYDROXY: 45.5 NG/ML
VLDLC SERPL CALC-MCNC: 27 MG/DL
WBC # BLD: 9.9 K/UL (ref 4–11)
WBC UA: 2 /HPF (ref 0–5)

## 2022-09-13 LAB
HIV AG/AB: NORMAL
HIV ANTIGEN: NORMAL
HIV-1 ANTIBODY: NORMAL
HIV-2 AB: NORMAL

## 2022-09-14 ENCOUNTER — HOSPITAL ENCOUNTER (OUTPATIENT)
Dept: GENERAL RADIOLOGY | Age: 67
Discharge: HOME | End: 2022-09-14
Payer: MEDICARE

## 2022-09-14 DIAGNOSIS — R73.9 ELEVATED BLOOD SUGAR: Primary | ICD-10-CM

## 2022-09-14 DIAGNOSIS — Z00.00 PE (PHYSICAL EXAM), ANNUAL: ICD-10-CM

## 2022-09-14 PROCEDURE — 77080 DXA BONE DENSITY AXIAL: CPT

## 2022-10-06 RX ORDER — LOSARTAN POTASSIUM AND HYDROCHLOROTHIAZIDE 12.5; 5 MG/1; MG/1
TABLET ORAL
Qty: 90 TABLET | Refills: 3 | Status: SHIPPED | OUTPATIENT
Start: 2022-10-06

## 2022-10-25 ENCOUNTER — PATIENT MESSAGE (OUTPATIENT)
Dept: INTERNAL MEDICINE CLINIC | Age: 67
End: 2022-10-25

## 2022-10-25 ENCOUNTER — TELEPHONE (OUTPATIENT)
Dept: INTERNAL MEDICINE CLINIC | Age: 67
End: 2022-10-25

## 2022-10-25 NOTE — TELEPHONE ENCOUNTER
Significant congestion  (Newest Message First)  Michael Hobson \"Madeline\"  P [facility] [place] [place] [address] [facility] (supporting Marianna Bailey MD) 22 hours ago (1:00 PM)     AK  We returned from [facility] yesterday. I was already congested before the plane flights. Now my ears are blocked completely. I have taken Promethazine - prescribed 4/12/22 and Advil. Any other recommendations? Also, would like to discuss my lab results from visit in September.     Thanks,  Kena Mcduffie

## 2022-10-25 NOTE — TELEPHONE ENCOUNTER
----- Message from [employer] sent at 10/25/2022  9:37 AM EDT -----  Subject: Message to Provider    QUESTIONS  Information for Provider? Pt is calling in wanting to know what she can   take for head congestion, cough, stuffy runny nose, ears are completely   clogged x4 days. Covid test negative somewhere around the week of   10/14/2022 [facility] [address], [address]. Pt would   also like to know the results of her blood work from her 9/8/2022 AWV.   ---------------------------------------------------------------------------  --------------  [facility]  [phone]; OK to leave message on voicemail  ---------------------------------------------------------------------------  --------------  SCRIPT ANSWERS  Relationship to Patient?  Self

## 2022-10-26 DIAGNOSIS — J06.9 UPPER RESPIRATORY TRACT INFECTION, UNSPECIFIED TYPE: Primary | ICD-10-CM

## 2022-10-26 RX ORDER — AZITHROMYCIN 250 MG/1
TABLET, FILM COATED ORAL
Qty: 1 PACKET | Refills: 0 | Status: SHIPPED | OUTPATIENT
Start: 2022-10-26 | End: 2022-11-05

## 2022-10-26 NOTE — TELEPHONE ENCOUNTER
Yamile Greer \"Madeline\"  [facility] [place] [facility] (supporting Brandon Foster, [place], [place]) 16 hours ago (4:52 PM)     AK  My home covid test is negative. Might an antibiotic be in order?   Thanks,  Anastacia Montanez

## 2022-10-31 ENCOUNTER — OFFICE VISIT (OUTPATIENT)
Dept: INTERNAL MEDICINE CLINIC | Age: 67
End: 2022-10-31
Payer: MEDICARE

## 2022-10-31 ENCOUNTER — TELEPHONE (OUTPATIENT)
Dept: INTERNAL MEDICINE CLINIC | Age: 67
End: 2022-10-31

## 2022-10-31 VITALS
HEART RATE: 72 BPM | TEMPERATURE: 97.8 F | WEIGHT: 180 LBS | SYSTOLIC BLOOD PRESSURE: 130 MMHG | OXYGEN SATURATION: 98 % | DIASTOLIC BLOOD PRESSURE: 82 MMHG | BODY MASS INDEX: 32.92 KG/M2

## 2022-10-31 DIAGNOSIS — J40 BRONCHITIS: Primary | ICD-10-CM

## 2022-10-31 PROCEDURE — 99214 OFFICE O/P EST MOD 30 MIN: CPT | Performed by: INTERNAL MEDICINE

## 2022-10-31 PROCEDURE — 1036F TOBACCO NON-USER: CPT | Performed by: INTERNAL MEDICINE

## 2022-10-31 PROCEDURE — 3074F SYST BP LT 130 MM HG: CPT | Performed by: INTERNAL MEDICINE

## 2022-10-31 PROCEDURE — 1123F ACP DISCUSS/DSCN MKR DOCD: CPT | Performed by: INTERNAL MEDICINE

## 2022-10-31 PROCEDURE — G8427 DOCREV CUR MEDS BY ELIG CLIN: HCPCS | Performed by: INTERNAL MEDICINE

## 2022-10-31 PROCEDURE — 3017F COLORECTAL CA SCREEN DOC REV: CPT | Performed by: INTERNAL MEDICINE

## 2022-10-31 PROCEDURE — G8399 PT W/DXA RESULTS DOCUMENT: HCPCS | Performed by: INTERNAL MEDICINE

## 2022-10-31 PROCEDURE — G8417 CALC BMI ABV UP PARAM F/U: HCPCS | Performed by: INTERNAL MEDICINE

## 2022-10-31 PROCEDURE — G8484 FLU IMMUNIZE NO ADMIN: HCPCS | Performed by: INTERNAL MEDICINE

## 2022-10-31 PROCEDURE — 1090F PRES/ABSN URINE INCON ASSESS: CPT | Performed by: INTERNAL MEDICINE

## 2022-10-31 PROCEDURE — 3078F DIAST BP <80 MM HG: CPT | Performed by: INTERNAL MEDICINE

## 2022-10-31 RX ORDER — LEVOFLOXACIN 500 MG/1
500 TABLET, FILM COATED ORAL DAILY
Qty: 10 TABLET | Refills: 0 | Status: SHIPPED | OUTPATIENT
Start: 2022-10-31 | End: 2022-11-10

## 2022-10-31 SDOH — ECONOMIC STABILITY: FOOD INSECURITY: WITHIN THE PAST 12 MONTHS, YOU WORRIED THAT YOUR FOOD WOULD RUN OUT BEFORE YOU GOT MONEY TO BUY MORE.: NEVER TRUE

## 2022-10-31 SDOH — ECONOMIC STABILITY: FOOD INSECURITY: WITHIN THE PAST 12 MONTHS, THE FOOD YOU BOUGHT JUST DIDN'T LAST AND YOU DIDN'T HAVE MONEY TO GET MORE.: NEVER TRUE

## 2022-10-31 ASSESSMENT — SOCIAL DETERMINANTS OF HEALTH (SDOH): HOW HARD IS IT FOR YOU TO PAY FOR THE VERY BASICS LIKE FOOD, HOUSING, MEDICAL CARE, AND HEATING?: NOT HARD AT ALL

## 2022-10-31 NOTE — PROGRESS NOTES
Follow Up Visit  Established Patient Visit    Patient:  Lavon Aly                                               : 1955  Age: 73 y.o. MRN: [id number]  Date : 10/31/2022      CHIEF COMPLAINT: Lavon Aly is a 73 y.o. female who presents for : upper respiratory symptoms. She has been feeling sick for the last 3 weeks. At that time, she was prescribed Azithromycin for chronic sinusitis with little relief in her symptoms. Her symptoms include: non productive cough, low grade fever (100s), congestion, ear blockage. She also describes a posterior neck pain that worries her. Covid home test negative (). Symptoms have not gotten better so she decided to come to the clinic today.       Patient Active Problem List    Diagnosis Date Noted    Scalp lesion     Myxoid cyst 2018    Hyperglycemia 2018    Essential hypertension 2017    EKG abnormalities 2016    CTS (carpal tunnel syndrome) 2014    Premature ovarian failure 2014    Depression 2013    Sleep apnea 2013    Hyperlipidemia 2013       Constitutional:  Denies fever or chills   Eyes:  Denies change in visual acuity   HENT:  Denies nasal congestion or sore throat   Respiratory:  Denies cough or shortness of breath   Cardiovascular:  Denies chest pain or edema   GI:  Denies abdominal pain, nausea, vomiting, bloody stools or diarrhea   :  Denies dysuria   Musculoskeletal:  Denies back pain or joint pain   Integument:  Denies rash   Neurologic:  Denies headache, focal weakness or sensory changes   Endocrine:  Denies polyuria or polydipsia   Lymphatic:  Denies swollen glands   Psychiatric:  Denies depression or anxiety     Past Medical History:        Diagnosis Date    Arthritis     CTS (carpal tunnel syndrome) 2014    Depression 2013    EKG abnormalities 2016    PVC's    Headache     Hearing loss     HTN (hypertension) 2013    Hyperglycemia 2018    Premature ovarian failure 5/29/2014    Scalp lesion     Sleep apnea 01/31/2013    uses CPAP    TMJ dysfunction        Past Surgical History:        Procedure Laterality Date    CARPAL TUNNEL RELEASE Right     COLONOSCOPY      FINGER SURGERY Left     index    PRE-MALIGNANT / BENIGN SKIN LESION EXCISION N/A 1/21/2022    EXCISION OF SCALP LESION performed by Jenn Vuong MD at [address]         Allergies:  Bee venom, Food, Pcn [penicillins], and Penicillins    Current Medications:    Prior to Admission medications    Medication Sig Start Date End Date Taking? Authorizing Provider   levoFLOXacin (LEVAQUIN) 500 MG tablet Take 1 tablet by mouth daily for 10 days 10/31/22 11/10/22 Yes Ludwin Selby MD   losartan-hydroCHLOROthiazide [facility]) 50-12.5 MG per tablet TAKE 1 TABLET BY MOUTH EVERY DAY 10/6/22  Yes Karthik Nieves MD   escitalopram (LEXAPRO) 10 MG tablet 1 po daily 9/8/22  Yes Ludwin Selby MD   vitamin D (CHOLECALCIFEROL) 1000 UNIT TABS tablet Take 1,000 Units by mouth daily   Yes Historical Provider, MD   calcium carbonate (OSCAL) 500 MG TABS tablet Take 500 mg by mouth daily   Yes Historical Provider, MD   ascorbic acid (VITAMIN C) 500 MG tablet Take 1,000 mg by mouth daily   Yes Historical Provider, MD   azithromycin (ZITHROMAX Z-SHAWN) 250 MG tablet Two for one day and one for four days  Patient not taking: Reported on 10/31/2022 10/26/22 11/5/22  Ludwin Selby MD           Physical Exam:      Constitutional:  Well developed, well nourished, no acute distress, non-toxic appearance   Eyes:  PERRL, conjunctiva normal   HENT:  Atraumatic, external ears normal, nose normal, oropharynx moist, no pharyngeal exudates.  Neck- normal range of motion, no tenderness, supple   Respiratory:  No respiratory distress, normal breath sounds, no rales, no wheezing   Cardiovascular:  Normal rate, normal rhythm, no murmurs, no gallops, no rubs   GI:  Soft, nondistended, normal bowel sounds, nontender, no organomegaly, no mass, no rebound, no guarding   : No costovertebral angle tenderness   Musculoskeletal:  No edema, no tenderness, no deformities. Back- no tenderness  Integument:  Well hydrated, no rash   Lymphatic:  No lymphadenopathy noted   Neurologic:  Alert & oriented x 3, CN 2-12 normal, normal motor function, normal sensory function, no focal deficits noted   Psychiatric:  Speech and behavior appropriate       Vitals: /82   Pulse 72   Temp 97.8 °F (36.6 °C) (Temporal)   Wt 180 lb (81.6 kg)   SpO2 98%   BMI 32.92 kg/m²     Body mass index is 32.92 kg/m². Wt Readings from Last 3 Encounters:   10/31/22 180 lb (81.6 kg)   09/08/22 181 lb (82.1 kg)   02/02/22 182 lb (82.6 kg)         LABS:    CBC:   Lab Results   Component Value Date    WBC 9.9 09/12/2022    HGB 12.9 09/12/2022    HCT 39.0 09/12/2022    MCV 89.1 09/12/2022     09/12/2022         No results found for: IRON, TIBC, FERRITIN, FOLATE, [id number], PTH                                                          BMP:    Lab Results   Component Value Date     09/12/2022    K 4.4 09/12/2022     09/12/2022    CO2 27 09/12/2022       LFT's:   Lab Results   Component Value Date    ALT 22 09/12/2022    AST 25 09/12/2022    GGT 27 05/29/2014    ALKPHOS 76 09/12/2022    BILITOT <0.2 09/12/2022       Lipids:   Lab Results   Component Value Date    CHOL 224 (H) 09/12/2022    HDL 59 09/12/2022    LDLCALC 138 (H) 09/12/2022    TRIG 135 09/12/2022       INR: No results found for: INR, PROTIME    U/A:  Lab Results   Component Value Date    LABMICR YES 09/12/2022          Lab Results   Component Value Date    LABA1C 6.1 04/15/2019        Lab Results   Component Value Date    CREATININE 0.9 09/12/2022       -----------------------------------------------------------------       Assessment/Plan:   1. Bronchitis  Her symptoms include: non productive cough, low grade fever (100s), congestion, ear blockage for 3 weeks.    - Levaquin 500 mg for 10 days  - DepoMedrol 40 mg IM given today  - Encouraged increased fluid intake and rest  - Patient was instructed to call our office if her symptoms do not improve within 1 week

## 2022-10-31 NOTE — TELEPHONE ENCOUNTER
Illness and results  (Newest Message First)  Jackie Nicola \"Madeline\"  You 2 hours ago (11:15 AM)     AK  Thank you for the prescription last week. Unfortunately, it did not seem to be effective. I still have significant congestion, ear blockage, and in addition, I have neck pain - especially as the day progresses and blood emitted from the nose. I have continued to use Promethazine for cough (and Halls cough drops to evade coughing), Afrin for nasal decongestion, Advil for periodic low grade fever and pain. Neilmed to clear the sinuses morning and evening. Tried to call the office this morning - but says the lines are busy and call back another time - or send a message. This is my message. Please respond with guidance.   Shadia Dumont

## 2022-11-07 ENCOUNTER — TELEPHONE (OUTPATIENT)
Dept: ENT CLINIC | Age: 67
End: 2022-11-07

## 2022-11-07 NOTE — TELEPHONE ENCOUNTER
Patient is asking to be fit in anytime this week she has sinus infection and needs to be seen thank you

## 2022-11-08 ENCOUNTER — OFFICE VISIT (OUTPATIENT)
Dept: ENT CLINIC | Age: 67
End: 2022-11-08
Payer: MEDICARE

## 2022-11-08 VITALS
TEMPERATURE: 97.2 F | SYSTOLIC BLOOD PRESSURE: 124 MMHG | BODY MASS INDEX: 32.9 KG/M2 | HEART RATE: 71 BPM | DIASTOLIC BLOOD PRESSURE: 66 MMHG | HEIGHT: 62 IN | WEIGHT: 178.8 LBS

## 2022-11-08 DIAGNOSIS — R05.3 CHRONIC COUGH: ICD-10-CM

## 2022-11-08 DIAGNOSIS — J31.0 CHRONIC RHINITIS: Primary | ICD-10-CM

## 2022-11-08 PROCEDURE — 1123F ACP DISCUSS/DSCN MKR DOCD: CPT | Performed by: OTOLARYNGOLOGY

## 2022-11-08 PROCEDURE — G8417 CALC BMI ABV UP PARAM F/U: HCPCS | Performed by: OTOLARYNGOLOGY

## 2022-11-08 PROCEDURE — G8427 DOCREV CUR MEDS BY ELIG CLIN: HCPCS | Performed by: OTOLARYNGOLOGY

## 2022-11-08 PROCEDURE — 3074F SYST BP LT 130 MM HG: CPT | Performed by: OTOLARYNGOLOGY

## 2022-11-08 PROCEDURE — G8484 FLU IMMUNIZE NO ADMIN: HCPCS | Performed by: OTOLARYNGOLOGY

## 2022-11-08 PROCEDURE — 1090F PRES/ABSN URINE INCON ASSESS: CPT | Performed by: OTOLARYNGOLOGY

## 2022-11-08 PROCEDURE — 1036F TOBACCO NON-USER: CPT | Performed by: OTOLARYNGOLOGY

## 2022-11-08 PROCEDURE — 3017F COLORECTAL CA SCREEN DOC REV: CPT | Performed by: OTOLARYNGOLOGY

## 2022-11-08 PROCEDURE — G8399 PT W/DXA RESULTS DOCUMENT: HCPCS | Performed by: OTOLARYNGOLOGY

## 2022-11-08 PROCEDURE — 3078F DIAST BP <80 MM HG: CPT | Performed by: OTOLARYNGOLOGY

## 2022-11-08 PROCEDURE — 99213 OFFICE O/P EST LOW 20 MIN: CPT | Performed by: OTOLARYNGOLOGY

## 2022-11-08 RX ORDER — PREDNISONE 10 MG/1
TABLET ORAL
Qty: 20 TABLET | Refills: 0 | Status: SHIPPED | OUTPATIENT
Start: 2022-11-08 | End: 2022-11-18

## 2022-11-08 RX ORDER — PROMETHAZINE HYDROCHLORIDE AND CODEINE PHOSPHATE 6.25; 1 MG/5ML; MG/5ML
5 SYRUP ORAL 4 TIMES DAILY PRN
COMMUNITY

## 2022-11-08 NOTE — PROGRESS NOTES
FOLLOW UP VISIT:    CHIEF COMPLAINT: URI symptoms. INTERIM HISTORY: Nasal obstruction bilateral. Some sneezing. Cough. Productive in the AM, not after that. Ears feel full. No facial pain. Symptoms for several months. Has been on levofloxacin zithromax, steroids. Still symptomatic. Uses nasal/sinus . PAST MEDICAL HISTORY:   Social History     Tobacco Use   Smoking Status Never   Smokeless Tobacco Never                                                    Social History     Substance and Sexual Activity   Alcohol Use Yes    Alcohol/week: 2.0 standard drinks    Types: 1 Standard drinks or equivalent, 1 Glasses of wine per week                                                    Current Outpatient Medications:     promethazine-codeine (PHENERGAN WITH CODEINE) 6.25-10 MG/5ML syrup, Take 5 mLs by mouth 4 times daily as needed for Cough. , Disp: , Rfl:     levoFLOXacin (LEVAQUIN) 500 MG tablet, Take 1 tablet by mouth daily for 10 days, Disp: 10 tablet, Rfl: 0    losartan-hydroCHLOROthiazide (HYZAAR) 50-12.5 MG per tablet, TAKE 1 TABLET BY MOUTH EVERY DAY, Disp: 90 tablet, Rfl: 3    escitalopram (LEXAPRO) 10 MG tablet, 1 po daily, Disp: 90 tablet, Rfl: 2    vitamin D (CHOLECALCIFEROL) 1000 UNIT TABS tablet, Take 1,000 Units by mouth daily, Disp: , Rfl:     calcium carbonate (OSCAL) 500 MG TABS tablet, Take 500 mg by mouth daily, Disp: , Rfl:     ascorbic acid (VITAMIN C) 500 MG tablet, Take 1,000 mg by mouth daily, Disp: , Rfl:                                                  Past Medical History:   Diagnosis Date    Arthritis     CTS (carpal tunnel syndrome) 5/29/2014    Depression 1/31/2013    EKG abnormalities 08/30/2016    PVC's    Headache     Hearing loss     HTN (hypertension) 1/31/2013    Hyperglycemia 1/5/2018    Premature ovarian failure 5/29/2014    Scalp lesion     Sleep apnea 01/31/2013    uses CPAP    TMJ dysfunction                                                     Past Surgical History: Procedure Laterality Date    CARPAL TUNNEL RELEASE Right     COLONOSCOPY      FINGER SURGERY Left     index    PRE-MALIGNANT / BENIGN SKIN LESION EXCISION N/A 1/21/2022    EXCISION OF SCALP LESION performed by Zachary Dominique MD at [address] [place] [address] Avenue: Family history reviewed and except as pertinent to the interim history is not contributory. REVIEW OF SYSTEMS:  All pertinent positive and negative findings included in HPI. Otherwise, all other systems are reviewed and are negative    PHYSICAL EXAMINATION:   GENERAL: wdwn- no acute distress  RESPIRATORY: No stridor. COMMUNICATION :  Normal voice  MENTAL STATUS: Alert and oriented x3  HEAD AND FACE: No skin lesions of the face. EXTERNAL EARS AND NOSE: The external ears and nasal pyramid are normal.  FACIAL MUSCLES: All branches of the facial nerve intact. FACE PALPATION: Zygomatic arches and orbital rims are intact. No tenderness over the sinuses. EXTRAOCULAR MUSCLES: Intact with full range of motion. OTOSCOPY:  Normal tympanic membranes, middle ear spaces, and external auditory canals. TUNING FORKS:  Rinne ++ Campos midline at 512 Hz  INTRANASAL:  Septum midline, turbinates edematous clear nasal secretions. Erik Parisian LIPS, TEETH, GINGIVA:  Normal mucosa  PHARYNX:  Normal  NECK:  No masses. LYMPH NODES: No cervical lymphadenopathy. SALIVARY GLANDS: Parotid and submandibular glands normal.  THYROID: No goiter or thyroid nodules palpable. IMPRESSION: Suspect allergic etiology. PLAN: Prednisone 40 mg prescribed to taper over 8 days. If no resolution will image the paranasal sinuses. FOLLOW-UP: After steroid course.

## 2022-11-30 ENCOUNTER — OFFICE VISIT (OUTPATIENT)
Dept: ENT CLINIC | Age: 67
End: 2022-11-30
Payer: MEDICARE

## 2022-11-30 VITALS
HEART RATE: 69 BPM | WEIGHT: 178 LBS | SYSTOLIC BLOOD PRESSURE: 125 MMHG | HEIGHT: 62 IN | DIASTOLIC BLOOD PRESSURE: 73 MMHG | BODY MASS INDEX: 32.76 KG/M2

## 2022-11-30 DIAGNOSIS — R04.0 EPISTAXIS: Primary | ICD-10-CM

## 2022-11-30 DIAGNOSIS — R05.3 CHRONIC COUGH: ICD-10-CM

## 2022-11-30 PROCEDURE — 3078F DIAST BP <80 MM HG: CPT | Performed by: OTOLARYNGOLOGY

## 2022-11-30 PROCEDURE — G8427 DOCREV CUR MEDS BY ELIG CLIN: HCPCS | Performed by: OTOLARYNGOLOGY

## 2022-11-30 PROCEDURE — G8399 PT W/DXA RESULTS DOCUMENT: HCPCS | Performed by: OTOLARYNGOLOGY

## 2022-11-30 PROCEDURE — 99214 OFFICE O/P EST MOD 30 MIN: CPT | Performed by: OTOLARYNGOLOGY

## 2022-11-30 PROCEDURE — 1036F TOBACCO NON-USER: CPT | Performed by: OTOLARYNGOLOGY

## 2022-11-30 PROCEDURE — 3017F COLORECTAL CA SCREEN DOC REV: CPT | Performed by: OTOLARYNGOLOGY

## 2022-11-30 PROCEDURE — 1090F PRES/ABSN URINE INCON ASSESS: CPT | Performed by: OTOLARYNGOLOGY

## 2022-11-30 PROCEDURE — 3074F SYST BP LT 130 MM HG: CPT | Performed by: OTOLARYNGOLOGY

## 2022-11-30 PROCEDURE — 1123F ACP DISCUSS/DSCN MKR DOCD: CPT | Performed by: OTOLARYNGOLOGY

## 2022-11-30 PROCEDURE — G8484 FLU IMMUNIZE NO ADMIN: HCPCS | Performed by: OTOLARYNGOLOGY

## 2022-11-30 PROCEDURE — G8417 CALC BMI ABV UP PARAM F/U: HCPCS | Performed by: OTOLARYNGOLOGY

## 2022-11-30 PROCEDURE — 30901 CONTROL OF NOSEBLEED: CPT | Performed by: OTOLARYNGOLOGY

## 2022-11-30 PROCEDURE — 31575 DIAGNOSTIC LARYNGOSCOPY: CPT | Performed by: OTOLARYNGOLOGY

## 2022-11-30 RX ORDER — PANTOPRAZOLE SODIUM 40 MG/1
40 TABLET, DELAYED RELEASE ORAL
Qty: 30 TABLET | Refills: 5 | Status: SHIPPED | OUTPATIENT
Start: 2022-11-30

## 2022-11-30 NOTE — PROGRESS NOTES
FOLLOW UP VISIT:    CHIEF COMPLAINT: Cough, nosebleed    INTERIM HISTORY: Seen 4 weeks ago with a productive cough and some nasal obstruction and sneezing. Has been on antibiotics and steroids with no improvement. I thought that her symptoms were of allergic etiology and put her on prednisone 40 mg with an 8-day taper. She improved dramatically but once the steroids were done her symptoms recurred. Her cough is now back and she also has some hemoptysis. Cough is worse at night. Wakes her up. Voice is not adversely affected. Patient is a professional voice user  Also has nosebleeds. Bilateral.  Anterior only. Can start spontaneously. Last bled this morning. Patient uses CPAP with nasal buds. PAST MEDICAL HISTORY:   Social History     Tobacco Use   Smoking Status Never   Smokeless Tobacco Never                                                    Social History     Substance and Sexual Activity   Alcohol Use Yes    Alcohol/week: 2.0 standard drinks    Types: 1 Standard drinks or equivalent, 1 Glasses of wine per week                                                    Current Outpatient Medications:     promethazine-codeine (PHENERGAN WITH CODEINE) 6.25-10 MG/5ML syrup, Take 5 mLs by mouth 4 times daily as needed for Cough. , Disp: , Rfl:     losartan-hydroCHLOROthiazide (HYZAAR) 50-12.5 MG per tablet, TAKE 1 TABLET BY MOUTH EVERY DAY, Disp: 90 tablet, Rfl: 3    escitalopram (LEXAPRO) 10 MG tablet, 1 po daily, Disp: 90 tablet, Rfl: 2    vitamin D (CHOLECALCIFEROL) 1000 UNIT TABS tablet, Take 1,000 Units by mouth daily, Disp: , Rfl:     calcium carbonate (OSCAL) 500 MG TABS tablet, Take 500 mg by mouth daily, Disp: , Rfl:     ascorbic acid (VITAMIN C) 500 MG tablet, Take 1,000 mg by mouth daily, Disp: , Rfl:                                                  Past Medical History:   Diagnosis Date    Arthritis     CTS (carpal tunnel syndrome) 5/29/2014    Depression 1/31/2013    EKG abnormalities 08/30/2016 PVC's    Headache     Hearing loss     HTN (hypertension) 1/31/2013    Hyperglycemia 1/5/2018    Premature ovarian failure 5/29/2014    Scalp lesion     Sleep apnea 01/31/2013    uses CPAP    TMJ dysfunction                                                     Past Surgical History:   Procedure Laterality Date    CARPAL TUNNEL RELEASE Right     COLONOSCOPY      FINGER SURGERY Left     index    PRE-MALIGNANT / BENIGN SKIN LESION EXCISION N/A 1/21/2022    EXCISION OF SCALP LESION performed by Sarina Manning MD at [address] [place] [address] Avenue: Family history reviewed and except as pertinent to the interim history is not contributory. REVIEW OF SYSTEMS:  All pertinent positive and negative findings included in HPI. Otherwise, all other systems are reviewed and are negative    PHYSICAL EXAMINATION:   GENERAL: wdwn- no acute distress  RESPIRATORY: No stridor. COMMUNICATION :  Normal voice  MENTAL STATUS: Alert and oriented x3  HEAD AND FACE: No skin lesions of the face. EXTERNAL EARS AND NOSE: The external ears and nasal pyramid are normal.  FACIAL MUSCLES: All branches of the facial nerve intact. FACE PALPATION: Zygomatic arches and orbital rims are intact. No tenderness over the sinuses. EXTRAOCULAR MUSCLES: Intact with full range of motion. OTOSCOPY:  Normal tympanic membranes, middle ear spaces, and external auditory canals. TUNING FORKS:  Rinne ++ Campos midline at 512 Hz  INTRANASAL:  Septum midline, turbinates normal, high blood in both nares. LIPS, TEETH, GINGIVA:  Normal mucosa  PHARYNX:  Normal  NECK:  No masses. LYMPH NODES: No cervical lymphadenopathy. SALIVARY GLANDS: Parotid and submandibular glands normal.  THYROID: No goiter or thyroid nodules palpable. As the patient has symptoms suggestive of disease in the larynx or hypopharynx, fiberoptic laryngoscopy is performed. FIBEROPTIC LARYNGOSCOPY:  Nares topically anaesthetized with lidocaine spray.   Fiberoptic scope passed per naris into nasopharynx and hypopharyrnx and larynx visualized. Normal tongue base                                                          Normal epiglottis                                                          Normal vocal cords                                                          Normal pyriform sinuses   IMPRESSION: Cough, anterior epistaxis. PLAN: Both nares topically decongested with Afrin and anesthetized with lidocaine. Bleeding sites on both sides of the septum identified and cauterized with silver nitrate. Discussed possible etiologies of cough with the patient. She is on losartan for her hypertension. The incidence of cough with that is low. I suspected an allergic etiology especially with her good response to the corticosteroids but she said she had allergy testing last year which was negative. As her cough is worse at night I suspect extra esophageal reflux disease may play a role. Pantoprazole 40 mg prescribed to be taken prior to the evening meal.  In addition, the patient is complained of headache which is more occipital.  I believe that this is due to muscle tension and asked her to discuss this with her primary care physician. Patient also states she has been getting fevers but they have been below 100 and I do not find an ear nose and throat etiology for this. FOLLOW-UP: Using MyChart in 2 to 3 weeks to report the effectiveness of the proton pump inhibitor and decreasing her cough.

## 2022-12-02 ENCOUNTER — TELEPHONE (OUTPATIENT)
Dept: INTERNAL MEDICINE CLINIC | Age: 67
End: 2022-12-02

## 2022-12-02 NOTE — TELEPHONE ENCOUNTER
Patient's  called in stating that Dr. Debbie Pritchett wanted the patient to be seen by Dr. Oscar Mata for a cough. Patient was on a steroid that helped the cough but once she stopped taking it the cough came back. Patient is also having headaches. Pls call and advise.

## 2022-12-05 ENCOUNTER — HOSPITAL ENCOUNTER (OUTPATIENT)
Dept: GENERAL RADIOLOGY | Age: 67
Discharge: HOME | End: 2022-12-05
Payer: MEDICARE

## 2022-12-05 ENCOUNTER — OFFICE VISIT (OUTPATIENT)
Dept: INTERNAL MEDICINE CLINIC | Age: 67
End: 2022-12-05
Payer: MEDICARE

## 2022-12-05 ENCOUNTER — TELEPHONE (OUTPATIENT)
Dept: INTERNAL MEDICINE CLINIC | Age: 67
End: 2022-12-05

## 2022-12-05 ENCOUNTER — HOSPITAL ENCOUNTER (OUTPATIENT)
Age: 67
Discharge: HOME | End: 2022-12-05
Payer: MEDICARE

## 2022-12-05 VITALS
WEIGHT: 180 LBS | SYSTOLIC BLOOD PRESSURE: 123 MMHG | HEIGHT: 62 IN | BODY MASS INDEX: 33.13 KG/M2 | HEART RATE: 81 BPM | DIASTOLIC BLOOD PRESSURE: 65 MMHG | OXYGEN SATURATION: 96 %

## 2022-12-05 DIAGNOSIS — R05.2 SUBACUTE COUGH: Primary | ICD-10-CM

## 2022-12-05 DIAGNOSIS — R05.2 SUBACUTE COUGH: ICD-10-CM

## 2022-12-05 DIAGNOSIS — J45.20 MILD INTERMITTENT ASTHMA, UNSPECIFIED WHETHER COMPLICATED: ICD-10-CM

## 2022-12-05 PROBLEM — J44.9 CHRONIC OBSTRUCTIVE PULMONARY DISEASE, UNSPECIFIED (HCC): Status: ACTIVE | Noted: 2022-12-05

## 2022-12-05 PROCEDURE — 3017F COLORECTAL CA SCREEN DOC REV: CPT | Performed by: INTERNAL MEDICINE

## 2022-12-05 PROCEDURE — 1036F TOBACCO NON-USER: CPT | Performed by: INTERNAL MEDICINE

## 2022-12-05 PROCEDURE — G8484 FLU IMMUNIZE NO ADMIN: HCPCS | Performed by: INTERNAL MEDICINE

## 2022-12-05 PROCEDURE — G8427 DOCREV CUR MEDS BY ELIG CLIN: HCPCS | Performed by: INTERNAL MEDICINE

## 2022-12-05 PROCEDURE — 1090F PRES/ABSN URINE INCON ASSESS: CPT | Performed by: INTERNAL MEDICINE

## 2022-12-05 PROCEDURE — 3078F DIAST BP <80 MM HG: CPT | Performed by: INTERNAL MEDICINE

## 2022-12-05 PROCEDURE — 3074F SYST BP LT 130 MM HG: CPT | Performed by: INTERNAL MEDICINE

## 2022-12-05 PROCEDURE — G8399 PT W/DXA RESULTS DOCUMENT: HCPCS | Performed by: INTERNAL MEDICINE

## 2022-12-05 PROCEDURE — 71046 X-RAY EXAM CHEST 2 VIEWS: CPT

## 2022-12-05 PROCEDURE — G8417 CALC BMI ABV UP PARAM F/U: HCPCS | Performed by: INTERNAL MEDICINE

## 2022-12-05 PROCEDURE — 99214 OFFICE O/P EST MOD 30 MIN: CPT | Performed by: INTERNAL MEDICINE

## 2022-12-05 PROCEDURE — 1123F ACP DISCUSS/DSCN MKR DOCD: CPT | Performed by: INTERNAL MEDICINE

## 2022-12-05 RX ORDER — BUDESONIDE AND FORMOTEROL FUMARATE DIHYDRATE 80; 4.5 UG/1; UG/1
2 AEROSOL RESPIRATORY (INHALATION) 2 TIMES DAILY
Qty: 10.2 G | Refills: 3 | Status: SHIPPED | OUTPATIENT
Start: 2022-12-05

## 2022-12-05 NOTE — PROGRESS NOTES
Follow Up Visit  Established Patient Visit    Patient:  Zaira Isaac                                               : 1955  Age: 78 y.o. MRN: [id number]  Date : 2022      CHIEF COMPLAINT: Zaira Isaac is a 78 y.o. female who presents for recurrent upper and lower respiratory symptoms. Since August, the patient has been experiencing recurrent low grade fevers, non-productive cough, nasal congestion with mucus production and ear pressure. She has been treated with two types of antibiotics (azithromycin and levaquin) with no resolution of symptoms. Similarly, she has been treated with both oral and IM steroids, which have provided only temporary relief. Furthermore, there has been some blood in her mucus as of late. 1. Subacute cough  - XR CHEST STANDARD (2 VW); Future  - CBC with Auto Differential; Future  - Comprehensive Metabolic Panel; Future  - Sedimentation Rate;  Future    Patient Active Problem List    Diagnosis Date Noted    Chronic obstructive pulmonary disease, unspecified 2022     Priority: Medium    Scalp lesion     Myxoid cyst 2018    Hyperglycemia 2018    Essential hypertension 2017    EKG abnormalities 2016    CTS (carpal tunnel syndrome) 2014    Premature ovarian failure 2014    Depression 2013    Sleep apnea 2013    Hyperlipidemia 2013       Constitutional:  Denies fever or chills   Eyes:  Denies change in visual acuity   HENT:  Denies nasal congestion or sore throat   Respiratory:  Denies cough or shortness of breath   Cardiovascular:  Denies chest pain or edema   GI:  Denies abdominal pain, nausea, vomiting, bloody stools or diarrhea   :  Denies dysuria   Musculoskeletal:  Denies back pain or joint pain   Integument:  Denies rash   Neurologic:  Denies headache, focal weakness or sensory changes   Endocrine:  Denies polyuria or polydipsia   Lymphatic:  Denies swollen glands   Psychiatric:  Denies depression or anxiety Past Medical History:        Diagnosis Date    Arthritis     Chronic obstructive pulmonary disease, unspecified 12/5/2022    CTS (carpal tunnel syndrome) 5/29/2014    Depression 1/31/2013    EKG abnormalities 08/30/2016    PVC's    Headache     Hearing loss     HTN (hypertension) 1/31/2013    Hyperglycemia 1/5/2018    Premature ovarian failure 5/29/2014    Scalp lesion     Sleep apnea 01/31/2013    uses CPAP    TMJ dysfunction        Past Surgical History:        Procedure Laterality Date    CARPAL TUNNEL RELEASE Right     COLONOSCOPY      FINGER SURGERY Left     index    PRE-MALIGNANT / BENIGN SKIN LESION EXCISION N/A 1/21/2022    EXCISION OF SCALP LESION performed by Olu Marrero MD at [address]         Allergies:  Bee venom, Food, Pcn [penicillins], and Penicillins    Current Medications:    Prior to Admission medications    Medication Sig Start Date End Date Taking? Authorizing Provider   budesonide-formoterol (SYMBICORT) 80-4.5 MCG/ACT AERO Inhale 2 puffs into the lungs 2 times daily 12/5/22  Yes Yesica Roldan MD   pantoprazole (PROTONIX) 40 MG tablet Take 1 tablet by mouth daily (before dinner) 11/30/22  Yes Jimmy Thompson MD   promethazine-codeine (PHENERGAN WITH CODEINE) 6.25-10 MG/5ML syrup Take 5 mLs by mouth 4 times daily as needed for Cough.    Yes Historical Provider, MD   losartan-hydroCHLOROthiazide (HYZAAR) 50-12.5 MG per tablet TAKE 1 TABLET BY MOUTH EVERY DAY 10/6/22  Yes Trisha Ambriz MD   escitalopram (LEXAPRO) 10 MG tablet 1 po daily 9/8/22  Yes Yesica Roldan MD   vitamin D (CHOLECALCIFEROL) 1000 UNIT TABS tablet Take 1,000 Units by mouth daily   Yes Historical Provider, MD   calcium carbonate (OSCAL) 500 MG TABS tablet Take 500 mg by mouth daily   Yes Historical Provider, MD   ascorbic acid (VITAMIN C) 500 MG tablet Take 1,000 mg by mouth daily   Yes Historical Provider, MD           Physical Exam:      Constitutional:  Well developed, well nourished, no acute distress, non-toxic appearance   Eyes:  PERRL, conjunctiva normal   HENT:  Atraumatic, external ears normal, nose normal, oropharynx moist, no pharyngeal exudates. Neck- normal range of motion, no tenderness, supple   Respiratory:  Patient has bilateral wheezing. Cardiovascular:  Normal rate, normal rhythm, no murmurs, no gallops, no rubs   GI:  Soft, nondistended, normal bowel sounds, nontender, no organomegaly, no mass, no rebound, no guarding   :  No costovertebral angle tenderness   Musculoskeletal:  No edema, no tenderness, no deformities. Back- no tenderness  Integument:  Well hydrated, no rash   Lymphatic:  No lymphadenopathy noted   Neurologic:  Alert & oriented x 3, CN 2-12 normal, normal motor function, normal sensory function, no focal deficits noted   Psychiatric:  Speech and behavior appropriate       Vitals: /65   Pulse 81   Ht 5' 2\" (1.575 m)   Wt 180 lb (81.6 kg)   SpO2 96%   BMI 32.92 kg/m²     Body mass index is 32.92 kg/m².   Wt Readings from Last 3 Encounters:   12/05/22 180 lb (81.6 kg)   11/30/22 178 lb (80.7 kg)   11/08/22 178 lb 12.8 oz (81.1 kg)         LABS:    CBC:   Lab Results   Component Value Date    WBC 9.9 09/12/2022    HGB 12.9 09/12/2022    HCT 39.0 09/12/2022    MCV 89.1 09/12/2022     09/12/2022         No results found for: IRON, TIBC, FERRITIN, FOLATE, ENKTBUGO90, PTH                                                          BMP:    Lab Results   Component Value Date     09/12/2022    K 4.4 09/12/2022     09/12/2022    CO2 27 09/12/2022       LFT's:   Lab Results   Component Value Date    ALT 22 09/12/2022    AST 25 09/12/2022    GGT 27 05/29/2014    ALKPHOS 76 09/12/2022    BILITOT <0.2 09/12/2022       Lipids:   Lab Results   Component Value Date    CHOL 224 (H) 09/12/2022    HDL 59 09/12/2022    LDLCALC 138 (H) 09/12/2022    TRIG 135 09/12/2022       INR: No results found for: INR, PROTIME    U/A:  Lab Results   Component Value Date    LABMICR YES 09/12/2022 Lab Results   Component Value Date    LABA1C 6.1 04/15/2019        Lab Results   Component Value Date    CREATININE 0.9 09/12/2022       -----------------------------------------------------------------       Assessment/Plan:   1. Subacute cough - Patient has been experiencing a cough for the past several months which has been refractory to antibiotics (levaquin and azithromycin) and PO/IM steroids. At this point, the cough could be due to reactive airway disease versus asthma. However, given the length of symptoms and refractory to treatment, we will get imaging and some blood work to rule out any ongoing infectious processes. - Symbicort - two puffs two times daily. - XR CHEST STANDARD (2 VW); Future  - CBC with Auto Differential; Future  - Comprehensive Metabolic Panel; Future  - Sedimentation Rate;  Future

## 2022-12-05 NOTE — TELEPHONE ENCOUNTER
Would like Dr. Esme Morgan to contact me  (Newest Message First)  Richelle Guerrero routed conversation to [employer] [facility] 2 hours ago (12:11 PM)     Richelle Guerrero 2 hours ago (12:11 PM)     YS   said they talk to a friend that was a Doctor and they suggested she get a Chest X ray and a CBC lab         Please advise

## 2022-12-06 ENCOUNTER — TELEPHONE (OUTPATIENT)
Dept: INTERNAL MEDICINE CLINIC | Age: 67
End: 2022-12-06

## 2022-12-06 ENCOUNTER — HOSPITAL ENCOUNTER (OUTPATIENT)
Dept: CT IMAGING | Age: 67
Discharge: HOME | End: 2022-12-06
Payer: MEDICARE

## 2022-12-06 DIAGNOSIS — R91.8 ABNORMAL CHEST X-RAY WITH MULTIPLE LUNG NODULES: Primary | ICD-10-CM

## 2022-12-06 DIAGNOSIS — R91.8 ABNORMAL CHEST X-RAY WITH MULTIPLE LUNG NODULES: ICD-10-CM

## 2022-12-06 PROCEDURE — 71260 CT THORAX DX C+: CPT

## 2022-12-06 PROCEDURE — 6360000004 HC RX CONTRAST MEDICATION: Performed by: INTERNAL MEDICINE

## 2022-12-06 RX ADMIN — IOPAMIDOL 80 ML: 755 INJECTION, SOLUTION INTRAVENOUS at 18:24

## 2022-12-06 NOTE — TELEPHONE ENCOUNTER
calling to get lab results. Seen they were back on mychart and had some questions. .        Please advise

## 2022-12-07 ENCOUNTER — PATIENT MESSAGE (OUTPATIENT)
Dept: INTERNAL MEDICINE CLINIC | Age: 67
End: 2022-12-07

## 2022-12-07 NOTE — TELEPHONE ENCOUNTER
From: Angelika Colon  To: Dr. Sol Garza: 12/7/2022 12:30 PM EST  Subject: CT follow up    Dr. India Branham,  Want to keep you in the loop of what is happening. I have an appointment for next Monday afternoon at [address] [place] [address] at the [facility]. This appointment will then lead to scheduling the bronchoscopy and biopsy - depending on what equipment is needed for the procedure. They said they can access the scans electronically - but my call you if there are challenges. Please advise if there is anything else I should be be thinking about at this time.    Trying to be hopeful and positive,  Madeline

## 2022-12-16 ENCOUNTER — TELEPHONE (OUTPATIENT)
Dept: INTERNAL MEDICINE CLINIC | Age: 67
End: 2022-12-16

## 2022-12-16 NOTE — TELEPHONE ENCOUNTER
----- Message from Gage Miguel sent at 12/15/2022 11:46 PM EST -----  Regarding: CT follow up  Further update. I will be having an outpatient Pet Scan tomorrow morning, Dec. 16 at [facility] and a bronchoscopy/biopsy in the morning on Dec. 23rd, at [facility] outpatient.    [facility]

## 2022-12-22 ENCOUNTER — HOSPITAL ENCOUNTER (OUTPATIENT)
Dept: MAMMOGRAPHY | Age: 67
Discharge: HOME | End: 2022-12-22
Payer: MEDICARE

## 2022-12-22 VITALS — HEIGHT: 62 IN | WEIGHT: 170 LBS | BODY MASS INDEX: 31.28 KG/M2

## 2022-12-22 DIAGNOSIS — Z12.39 SCREENING BREAST EXAMINATION: ICD-10-CM

## 2022-12-22 PROCEDURE — 77063 BREAST TOMOSYNTHESIS BI: CPT

## 2023-01-03 ENCOUNTER — TELEPHONE (OUTPATIENT)
Dept: INTERNAL MEDICINE CLINIC | Age: 68
End: 2023-01-03

## 2023-01-03 NOTE — TELEPHONE ENCOUNTER
----- Message from Mercedes Pritchard sent at 1/2/2023  3:52 PM EST -----  Regarding: Testing  I had a bronchoscopy last Friday morning  1/30 and the results are slowly coming in to the  my chart site. You should have access. Unfortunately, my trachea is \"pediatric\" size and they we not able to use the EBUS apparatus. So far it appears that there are no answers. The biopsies should be in later this week.   Emily

## 2023-01-04 ENCOUNTER — TELEPHONE (OUTPATIENT)
Dept: INTERNAL MEDICINE CLINIC | Age: 68
End: 2023-01-04

## 2023-01-04 NOTE — TELEPHONE ENCOUNTER
Follow up appointment. Call returned to the patient. She is in the emergency room at [facility]. Going to have a temporal artery biopsy. Some of the path reports are back and do not show cancer but an autoimmune disease. Please call to discuss.

## 2023-01-04 NOTE — TELEPHONE ENCOUNTER
Testing  (Newest Message First)  Lindsay Ventura \"Madeline\"  [facility] (supporting Igor Jernigan, [place], [place]) 20 hours ago (1:13 PM)     HEIDI Russell is writing this message with Madeline's active participation. We are still waiting for additional results from the bronchoscopy. However, we are concerned that Madeline's fever is no longer restricted to low grade. It has gone up above 101 a few times until Advil kicks in and brings it down to normal. Essentially, since we saw you on December 5th, Sayra Temple has been sick. Isabellas feet are often cold and she is experiencing partial neuropathy. Her appetite is sporadic at best. Please respond to this my chart entry. Sayra Temple can be reached at [phone] and Drew can be reached at [phone]. Thank you.      Respectfully,  Benedict Pat \"Madeline\"

## 2023-01-09 ENCOUNTER — TELEPHONE (OUTPATIENT)
Dept: INTERNAL MEDICINE CLINIC | Age: 68
End: 2023-01-09

## 2023-01-09 ENCOUNTER — PATIENT MESSAGE (OUTPATIENT)
Dept: INTERNAL MEDICINE CLINIC | Age: 68
End: 2023-01-09

## 2023-01-09 DIAGNOSIS — R73.9 ELEVATED BLOOD SUGAR: Primary | ICD-10-CM

## 2023-01-09 NOTE — TELEPHONE ENCOUNTER
Appointment on Wednesday afternoon    Will keep original appointment. The patient's Vancomycin therapy has been completed / discontinued  Thank you for this consult; Pharmacy will sign-off now

## 2023-01-09 NOTE — TELEPHONE ENCOUNTER
----- Message from Durga Jefferson sent at 1/9/2023 10:40 AM EST -----  Subject: Message to Provider    QUESTIONS  Information for Provider? Patient was recently diagnosed as a diabetic at   her hospital visit. Her follow up is not until 1/20 but she is needing to   know how to handle her blood sugar until her appointment. Please advise  ---------------------------------------------------------------------------  --------------  Mary Hagan INFO  [phone]; OK to leave message on voicemail  ---------------------------------------------------------------------------  --------------  SCRIPT ANSWERS  Relationship to Patient?  Self

## 2023-01-09 NOTE — TELEPHONE ENCOUNTER
Information regarding patient care  (Newest Message First)  Jonathan Jacobsono \"Madeline\"  P [facility] (supporting Earl Rod) Yesterday (9:35 AM)     AK  Dr. Frederick Gallardo,  I was discharged from the [facility] on Friday, January 6th at 6:30pm and was told to make a follow up appointment in the next two weeks. Can we set something up for the week of January 16-20? With the marian doses of steroids I am taking, I am now also diabetic and learning how to manage the highs and lows of sugar. I was prescribed Metformin as a daily pill in the morning, am checking sugar 2x daily, and have been told that should my sugar go up to 300 (please NO!) that I need to come to you for insulin. Hoping this is all temporary, but who knows. Currently, I am to quarantine from Covid until Thursday, January 12th. 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 Again, the primary purpose of this message is to get an appointment with you on the books for the week of Jan. 16-20. I will be totally flexible to your timing needs.   Thank you,  Dominick Betts

## 2023-01-11 RX ORDER — SYRING-NEEDL,DISP,INSUL,0.3 ML 31GX15/64"
5 SYRINGE, EMPTY DISPOSABLE MISCELLANEOUS EVERY 6 HOURS PRN
Qty: 30 EACH | Refills: 0 | Status: SHIPPED
Start: 2023-01-11 | End: 2023-02-20 | Stop reason: CLARIF

## 2023-01-11 RX ORDER — UBIQUINOL 100 MG
CAPSULE ORAL
Qty: 50 EACH | Refills: 0 | Status: SHIPPED
Start: 2023-01-11 | End: 2023-02-20 | Stop reason: CLARIF

## 2023-01-11 NOTE — TELEPHONE ENCOUNTER
Educational materials given to patient. She still wants to know what to do in case of an extremely high blood sugar.

## 2023-01-20 ENCOUNTER — OFFICE VISIT (OUTPATIENT)
Dept: INTERNAL MEDICINE CLINIC | Age: 68
End: 2023-01-20

## 2023-01-20 VITALS
BODY MASS INDEX: 29.44 KG/M2 | HEIGHT: 62 IN | SYSTOLIC BLOOD PRESSURE: 138 MMHG | WEIGHT: 160 LBS | DIASTOLIC BLOOD PRESSURE: 70 MMHG

## 2023-01-20 DIAGNOSIS — R73.9 HYPERGLYCEMIA: ICD-10-CM

## 2023-01-20 DIAGNOSIS — R73.9 HYPERGLYCEMIA: Primary | ICD-10-CM

## 2023-01-20 DIAGNOSIS — E11.65 TYPE 2 DIABETES MELLITUS WITH HYPERGLYCEMIA, WITHOUT LONG-TERM CURRENT USE OF INSULIN (HCC): ICD-10-CM

## 2023-01-20 PROBLEM — E11.9 TYPE 2 DIABETES MELLITUS (HCC): Status: ACTIVE | Noted: 2023-01-20

## 2023-01-20 LAB
BASOPHILS ABSOLUTE: 0 K/UL (ref 0–0.2)
BASOPHILS RELATIVE PERCENT: 0.1 %
EOSINOPHILS ABSOLUTE: 0 K/UL (ref 0–0.6)
EOSINOPHILS RELATIVE PERCENT: 0 %
HCT VFR BLD CALC: 38.8 % (ref 36–48)
HEMOGLOBIN: 12.2 G/DL (ref 12–16)
LYMPHOCYTES ABSOLUTE: 0.5 K/UL (ref 1–5.1)
LYMPHOCYTES RELATIVE PERCENT: 3.6 %
MCH RBC QN AUTO: 27.4 PG (ref 26–34)
MCHC RBC AUTO-ENTMCNC: 31.5 G/DL (ref 31–36)
MCV RBC AUTO: 86.9 FL (ref 80–100)
MONOCYTES ABSOLUTE: 0.1 K/UL (ref 0–1.3)
MONOCYTES RELATIVE PERCENT: 0.4 %
NEUTROPHILS ABSOLUTE: 11.9 K/UL (ref 1.7–7.7)
NEUTROPHILS RELATIVE PERCENT: 95.9 %
PDW BLD-RTO: 19.1 % (ref 12.4–15.4)
PLATELET # BLD: 336 K/UL (ref 135–450)
PMV BLD AUTO: 9.5 FL (ref 5–10.5)
RBC # BLD: 4.47 M/UL (ref 4–5.2)
WBC # BLD: 12.5 K/UL (ref 4–11)

## 2023-01-20 RX ORDER — SULFAMETHOXAZOLE AND TRIMETHOPRIM 800; 160 MG/1; MG/1
1 TABLET ORAL DAILY
COMMUNITY

## 2023-01-20 RX ORDER — DILTIAZEM HYDROCHLORIDE 60 MG/1
2 TABLET, FILM COATED ORAL 2 TIMES DAILY
Qty: 10.2 G | Refills: 3 | Status: SHIPPED | OUTPATIENT
Start: 2023-01-20

## 2023-01-20 RX ORDER — PREDNISONE 20 MG/1
80 TABLET ORAL DAILY
COMMUNITY

## 2023-01-20 RX ORDER — CLOTRIMAZOLE AND BETAMETHASONE DIPROPIONATE 10; .5 MG/ML; MG/ML
LOTION TOPICAL
Qty: 30 ML | Refills: 2 | Status: SHIPPED | OUTPATIENT
Start: 2023-01-20

## 2023-01-20 ASSESSMENT — PATIENT HEALTH QUESTIONNAIRE - PHQ9
9. THOUGHTS THAT YOU WOULD BE BETTER OFF DEAD, OR OF HURTING YOURSELF: 0
5. POOR APPETITE OR OVEREATING: 0
10. IF YOU CHECKED OFF ANY PROBLEMS, HOW DIFFICULT HAVE THESE PROBLEMS MADE IT FOR YOU TO DO YOUR WORK, TAKE CARE OF THINGS AT HOME, OR GET ALONG WITH OTHER PEOPLE: 0
SUM OF ALL RESPONSES TO PHQ QUESTIONS 1-9: 0
SUM OF ALL RESPONSES TO PHQ QUESTIONS 1-9: 0
6. FEELING BAD ABOUT YOURSELF - OR THAT YOU ARE A FAILURE OR HAVE LET YOURSELF OR YOUR FAMILY DOWN: 0
SUM OF ALL RESPONSES TO PHQ QUESTIONS 1-9: 0
7. TROUBLE CONCENTRATING ON THINGS, SUCH AS READING THE NEWSPAPER OR WATCHING TELEVISION: 0
2. FEELING DOWN, DEPRESSED OR HOPELESS: 0
SUM OF ALL RESPONSES TO PHQ9 QUESTIONS 1 & 2: 0
SUM OF ALL RESPONSES TO PHQ QUESTIONS 1-9: 0
3. TROUBLE FALLING OR STAYING ASLEEP: 0
8. MOVING OR SPEAKING SO SLOWLY THAT OTHER PEOPLE COULD HAVE NOTICED. OR THE OPPOSITE, BEING SO FIGETY OR RESTLESS THAT YOU HAVE BEEN MOVING AROUND A LOT MORE THAN USUAL: 0
1. LITTLE INTEREST OR PLEASURE IN DOING THINGS: 0
4. FEELING TIRED OR HAVING LITTLE ENERGY: 0

## 2023-01-20 NOTE — PROGRESS NOTES
CHIEF COMPLAINT: Cathy Schilling is a 78 y.o. 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 steroid-induced hyperglycemia    HPI: Patient presented with follow-up of the above her blood sugars now been ranging about 180 she has not had to take insulin but stays on metformin she has lost considerable weight denies any chest pain shortness of breath or any other problems    Review of Systems:   Constitutional:  Denies fever or chills   Eyes:  Denies change in visual acuity   HENT:  Denies nasal congestion or sore throat   Respiratory:  Denies cough or shortness of breath   Cardiovascular:  Denies chest pain or edema   GI:  Denies abdominal pain, nausea, vomiting, bloody stools or diarrhea   :  Denies dysuria   Musculoskeletal:  Denies back pain or joint pain   Integument:  Denies rash   Neurologic:  Denies headache, focal weakness or sensory changes   Endocrine:  Denies polyuria or polydipsia   Lymphatic:  Denies swollen glands   Psychiatric:  Denies depression or anxiety     Past Medical History:        Diagnosis Date    Arthritis     Chronic obstructive pulmonary disease, unspecified 12/5/2022    CTS (carpal tunnel syndrome) 5/29/2014    Depression 1/31/2013    EKG abnormalities 08/30/2016    PVC's    Headache     Hearing loss     HTN (hypertension) 1/31/2013    Hyperglycemia 1/5/2018    Premature ovarian failure 5/29/2014    Scalp lesion     Sleep apnea 01/31/2013    uses CPAP    TMJ dysfunction        Past Surgical History:        Procedure Laterality Date    CARPAL TUNNEL RELEASE Right     COLONOSCOPY      FINGER SURGERY Left     index    PRE-MALIGNANT / BENIGN SKIN LESION EXCISION N/A 1/21/2022    EXCISION OF SCALP LESION performed by Harika Kohli MD at [address]       Family History:  Family History   Problem Relation Age of Onset    Other Mother         Lewy Body disease    Stroke Father     Diabetes Father     Heart Disease Father        Social History:  Social History     Socioeconomic History    Marital status:      Spouse name: None    Number of children: None    Years of education: None    Highest education level: None   Tobacco Use    Smoking status: Never    Smokeless tobacco: Never   Vaping Use    Vaping Use: Never used   Substance and Sexual Activity    Alcohol use: Yes     Alcohol/week: 2.0 standard drinks     Types: 1 Standard drinks or equivalent, 1 Glasses of wine per week    Drug use: Never   Social History Narrative    ** Merged History Encounter **          Social Determinants of Health     Financial Resource Strain: Low Risk     Difficulty of Paying Living Expenses: Not hard at all   Food Insecurity: No Food Insecurity    Worried About Running Out of Food in the Last Year: Never true    Ran Out of Food in the Last Year: Never true   Physical Activity: Sufficiently Active    Days of Exercise per Week: 5 days    Minutes of Exercise per Session: 80 min         Allergies:  Bee venom, Food, Pcn [penicillins], and Penicillins    Current Medications:    Prior to Admission medications    Medication Sig Start Date End Date Taking? Authorizing Provider   SYMBICORT 80-4.5 MCG/ACT AERO Inhale 2 puffs into the lungs 2 times daily 1/20/23  Yes Ranjeet Hurley MD   sulfamethoxazole-trimethoprim (BACTRIM DS;SEPTRA DS) 800-160 MG per tablet Take 1 tablet by mouth daily   Yes Historical Provider, MD   metFORMIN (GLUCOPHAGE) 500 MG tablet Take 500 mg by mouth daily (with breakfast)   Yes Historical Provider, MD   predniSONE (DELTASONE) 20 MG tablet Take 80 mg by mouth daily   Yes Historical Provider, MD   clotrimazole-betamethasone (LOTRISONE) 1-0.05 % lotion Apply topically 2 times daily.  1/20/23  Yes Ranjeet Hurley MD   insulin regular (HUMULIN R;NOVOLIN R) 100 UNIT/ML injection Inject 5 Units into the skin See Admin Instructions 1/20/23  Yes Ranjeet Hurley MD   insulin regular (NOVOLIN R) 100 UNIT/ML injection Inject 5 Units into the skin every 6 hours as needed for High Blood Sugar (if blood sugar is above 200) 1/11/23  Yes Gunnar De Leon MD   Insulin Syringe-Needle U-100 (BD VEO INSULIN SYRINGE U/F) 31G X 15/64\" 0.5 ML MISC 5 Units by Does not apply route every 6 hours as needed (if blood sugar is above 200) 1/11/23  Yes Gunnar De Leon MD   Alcohol Swabs (ALCOHOL PREP) 70 % PADS Use to clean skin 1/11/23  Yes Gunnar De Leon MD   losartan-hydroCHLOROthiazide [facility]) 50-12.5 MG per tablet TAKE 1 TABLET BY MOUTH EVERY DAY 10/6/22  Yes Praveen Gomez MD   escitalopram (LEXAPRO) 10 MG tablet 1 po daily 9/8/22  Yes Gunnar De Leon MD   vitamin D (CHOLECALCIFEROL) 1000 UNIT TABS tablet Take 1,000 Units by mouth daily   Yes Historical Provider, MD   calcium carbonate (OSCAL) 500 MG TABS tablet Take 500 mg by mouth daily   Yes Historical Provider, MD   ascorbic acid (VITAMIN C) 500 MG tablet Take 1,000 mg by mouth daily   Yes Historical Provider, MD       Physical Exam:  Vital Signs: /70 (Site: Right Upper Arm)   Ht 5' 2\" (1.575 m)   Wt 160 lb (72.6 kg)   BMI 29.26 kg/m²   General: Patient appears  non-toxic  HENT: Atraumatic, normocephalic, oral mucosa moist  Lungs:  Clear bilaterally  Heart: Regular rate and rhythm  Abdomen: Non-distended, soft, non-tender  Extremities: No edema  Neuro: Nonfocal    Medical Decision Making and Plan:  Pertinent Labs & Imaging studies reviewed.  (See chart for details)  Blood studies are pending  Prednisone induced hyperglycemia and diabetes we will check above labs continue present meds was offered a continuous glucose monitor and to follow-up with [facility] as she has a contact there who is an expert in giant cell arteritis and IgG4 abnormalities

## 2023-01-21 LAB
A/G RATIO: 1.6 (ref 1.1–2.2)
ALBUMIN SERPL-MCNC: 4.2 G/DL (ref 3.4–5)
ALP BLD-CCNC: 62 U/L (ref 40–129)
ALT SERPL-CCNC: 20 U/L (ref 10–40)
ANION GAP SERPL CALCULATED.3IONS-SCNC: 14 MMOL/L (ref 3–16)
AST SERPL-CCNC: 27 U/L (ref 15–37)
BILIRUB SERPL-MCNC: 0.3 MG/DL (ref 0–1)
BUN BLDV-MCNC: 29 MG/DL (ref 7–20)
CALCIUM SERPL-MCNC: 9.9 MG/DL (ref 8.3–10.6)
CHLORIDE BLD-SCNC: 97 MMOL/L (ref 99–110)
CO2: 25 MMOL/L (ref 21–32)
CREAT SERPL-MCNC: 1.1 MG/DL (ref 0.6–1.2)
ESTIMATED AVERAGE GLUCOSE: 128.4 MG/DL
GFR SERPL CREATININE-BSD FRML MDRD: 55 ML/MIN/{1.73_M2}
GLUCOSE BLD-MCNC: 249 MG/DL (ref 70–99)
HBA1C MFR BLD: 6.1 %
POTASSIUM SERPL-SCNC: 5.1 MMOL/L (ref 3.5–5.1)
SODIUM BLD-SCNC: 136 MMOL/L (ref 136–145)
TOTAL PROTEIN: 6.9 G/DL (ref 6.4–8.2)

## 2023-01-30 ENCOUNTER — TELEPHONE (OUTPATIENT)
Dept: INTERNAL MEDICINE CLINIC | Age: 68
End: 2023-01-30

## 2023-01-30 NOTE — TELEPHONE ENCOUNTER
----- Message from Natali Melitonpberry sent at 1/30/2023  6:36 AM EST -----  Regarding: Appointment request  Dr. Vlad Salomon,  I still have not had a flu shot for this 2636-9542 season. Would it be marie to wait? Or should I just go ahead and have it done now? I am in the midst of the hospital follow up appointments - that will continue through February 14th.   Thanks in advance,  Yu Manrique

## 2023-01-30 NOTE — TELEPHONE ENCOUNTER
----- Message from Елена Aldana sent at 1/30/2023  6:36 AM EST -----  Regarding: Appointment request  Dr. Keila Tam,  I still have not had a flu shot for this 6728-1570 season. Would it be marie to wait? Or should I just go ahead and have it done now? I am in the midst of the hospital follow up appointments - that will continue through February 14th.   Thanks in advance,  Jimmy Carrasco

## 2023-01-31 NOTE — TELEPHONE ENCOUNTER
Refill-     metFORMIN (GLUCOPHAGE) 500 MG tablet      [facility] [id number] - [place], [address] [place] [address] [phone]      Please advise

## 2023-02-20 ENCOUNTER — OFFICE VISIT (OUTPATIENT)
Dept: INTERNAL MEDICINE CLINIC | Age: 68
End: 2023-02-20
Payer: MEDICARE

## 2023-02-20 VITALS
HEIGHT: 62 IN | DIASTOLIC BLOOD PRESSURE: 84 MMHG | HEART RATE: 76 BPM | BODY MASS INDEX: 32.02 KG/M2 | WEIGHT: 174 LBS | SYSTOLIC BLOOD PRESSURE: 155 MMHG

## 2023-02-20 DIAGNOSIS — E11.65 TYPE 2 DIABETES MELLITUS WITH HYPERGLYCEMIA, WITHOUT LONG-TERM CURRENT USE OF INSULIN (HCC): ICD-10-CM

## 2023-02-20 DIAGNOSIS — M31.6 GIANT CELL ARTERITIS (HCC): ICD-10-CM

## 2023-02-20 DIAGNOSIS — D89.89 IGG4 RELATED DISEASE (HCC): ICD-10-CM

## 2023-02-20 DIAGNOSIS — B37.0 ORAL THRUSH: ICD-10-CM

## 2023-02-20 DIAGNOSIS — B37.0 ORAL THRUSH: Primary | ICD-10-CM

## 2023-02-20 PROBLEM — D89.84 IGG4 RELATED DISEASE: Status: ACTIVE | Noted: 2023-02-20

## 2023-02-20 LAB
A/G RATIO: 2.3 (ref 1.1–2.2)
ALBUMIN SERPL-MCNC: 4.2 G/DL (ref 3.4–5)
ALP BLD-CCNC: 53 U/L (ref 40–129)
ALT SERPL-CCNC: 24 U/L (ref 10–40)
ANION GAP SERPL CALCULATED.3IONS-SCNC: 13 MMOL/L (ref 3–16)
AST SERPL-CCNC: 34 U/L (ref 15–37)
BASOPHILS ABSOLUTE: 0 K/UL (ref 0–0.2)
BASOPHILS RELATIVE PERCENT: 0.1 %
BILIRUB SERPL-MCNC: 0.3 MG/DL (ref 0–1)
BUN BLDV-MCNC: 21 MG/DL (ref 7–20)
CALCIUM SERPL-MCNC: 9.7 MG/DL (ref 8.3–10.6)
CHLORIDE BLD-SCNC: 98 MMOL/L (ref 99–110)
CO2: 26 MMOL/L (ref 21–32)
CREAT SERPL-MCNC: 0.8 MG/DL (ref 0.6–1.2)
EOSINOPHILS ABSOLUTE: 0 K/UL (ref 0–0.6)
EOSINOPHILS RELATIVE PERCENT: 0 %
GFR SERPL CREATININE-BSD FRML MDRD: >60 ML/MIN/{1.73_M2}
GLUCOSE BLD-MCNC: 178 MG/DL (ref 70–99)
HCT VFR BLD CALC: 38.5 % (ref 36–48)
HEMOGLOBIN: 12.5 G/DL (ref 12–16)
LYMPHOCYTES ABSOLUTE: 0.7 K/UL (ref 1–5.1)
LYMPHOCYTES RELATIVE PERCENT: 5.3 %
MCH RBC QN AUTO: 28.3 PG (ref 26–34)
MCHC RBC AUTO-ENTMCNC: 32.3 G/DL (ref 31–36)
MCV RBC AUTO: 87.5 FL (ref 80–100)
MONOCYTES ABSOLUTE: 0.1 K/UL (ref 0–1.3)
MONOCYTES RELATIVE PERCENT: 1 %
NEUTROPHILS ABSOLUTE: 12.3 K/UL (ref 1.7–7.7)
NEUTROPHILS RELATIVE PERCENT: 93.6 %
PDW BLD-RTO: 21.1 % (ref 12.4–15.4)
PLATELET # BLD: 236 K/UL (ref 135–450)
PMV BLD AUTO: 9.5 FL (ref 5–10.5)
POTASSIUM SERPL-SCNC: 4.2 MMOL/L (ref 3.5–5.1)
RBC # BLD: 4.4 M/UL (ref 4–5.2)
SODIUM BLD-SCNC: 137 MMOL/L (ref 136–145)
TOTAL PROTEIN: 6 G/DL (ref 6.4–8.2)
WBC # BLD: 13.1 K/UL (ref 4–11)

## 2023-02-20 PROCEDURE — 1036F TOBACCO NON-USER: CPT | Performed by: INTERNAL MEDICINE

## 2023-02-20 PROCEDURE — G8417 CALC BMI ABV UP PARAM F/U: HCPCS | Performed by: INTERNAL MEDICINE

## 2023-02-20 PROCEDURE — 3044F HG A1C LEVEL LT 7.0%: CPT | Performed by: INTERNAL MEDICINE

## 2023-02-20 PROCEDURE — 1123F ACP DISCUSS/DSCN MKR DOCD: CPT | Performed by: INTERNAL MEDICINE

## 2023-02-20 PROCEDURE — 2022F DILAT RTA XM EVC RTNOPTHY: CPT | Performed by: INTERNAL MEDICINE

## 2023-02-20 PROCEDURE — 99214 OFFICE O/P EST MOD 30 MIN: CPT | Performed by: INTERNAL MEDICINE

## 2023-02-20 PROCEDURE — G8399 PT W/DXA RESULTS DOCUMENT: HCPCS | Performed by: INTERNAL MEDICINE

## 2023-02-20 PROCEDURE — G8484 FLU IMMUNIZE NO ADMIN: HCPCS | Performed by: INTERNAL MEDICINE

## 2023-02-20 PROCEDURE — 3077F SYST BP >= 140 MM HG: CPT | Performed by: INTERNAL MEDICINE

## 2023-02-20 PROCEDURE — 3017F COLORECTAL CA SCREEN DOC REV: CPT | Performed by: INTERNAL MEDICINE

## 2023-02-20 PROCEDURE — 3079F DIAST BP 80-89 MM HG: CPT | Performed by: INTERNAL MEDICINE

## 2023-02-20 PROCEDURE — G8427 DOCREV CUR MEDS BY ELIG CLIN: HCPCS | Performed by: INTERNAL MEDICINE

## 2023-02-20 PROCEDURE — 1090F PRES/ABSN URINE INCON ASSESS: CPT | Performed by: INTERNAL MEDICINE

## 2023-02-20 SDOH — ECONOMIC STABILITY: INCOME INSECURITY: HOW HARD IS IT FOR YOU TO PAY FOR THE VERY BASICS LIKE FOOD, HOUSING, MEDICAL CARE, AND HEATING?: NOT HARD AT ALL

## 2023-02-20 SDOH — ECONOMIC STABILITY: FOOD INSECURITY: WITHIN THE PAST 12 MONTHS, YOU WORRIED THAT YOUR FOOD WOULD RUN OUT BEFORE YOU GOT MONEY TO BUY MORE.: NEVER TRUE

## 2023-02-20 SDOH — ECONOMIC STABILITY: HOUSING INSECURITY
IN THE LAST 12 MONTHS, WAS THERE A TIME WHEN YOU DID NOT HAVE A STEADY PLACE TO SLEEP OR SLEPT IN A SHELTER (INCLUDING NOW)?: NO

## 2023-02-20 SDOH — ECONOMIC STABILITY: FOOD INSECURITY: WITHIN THE PAST 12 MONTHS, THE FOOD YOU BOUGHT JUST DIDN'T LAST AND YOU DIDN'T HAVE MONEY TO GET MORE.: NEVER TRUE

## 2023-02-20 NOTE — PROGRESS NOTES
CHIEF COMPLAINT: Pierce Senior is a 78 y.o. female who presents for : Follow-up of giant cell arteritis type 2 diabetes IgG4 related diseases    HPI: Patient presented with follow-up of the above she is having some difficulty swallowing and some white patches on her mouth she has been on Bactrim prophylactically as well as high-dose steroids she is tapered slowly off the high-dose steroids but is now on 60 mg    Review of Systems:   Constitutional:  Denies fever or chills   Eyes:  Denies change in visual acuity   HENT:  Denies nasal congestion or sore throat   Respiratory:  Denies cough or shortness of breath   Cardiovascular:  Denies chest pain or edema   GI:  Denies abdominal pain, nausea, vomiting, bloody stools or diarrhea   :  Denies dysuria   Musculoskeletal:  Denies back pain or joint pain   Integument:  Denies rash   Neurologic:  Denies headache, focal weakness or sensory changes   Endocrine:  Denies polyuria or polydipsia   Lymphatic:  Denies swollen glands   Psychiatric:  Denies depression or anxiety     Past Medical History:        Diagnosis Date    Arthritis     Chronic obstructive pulmonary disease, unspecified 12/5/2022    CTS (carpal tunnel syndrome) 5/29/2014    Depression 1/31/2013    EKG abnormalities 08/30/2016    PVC's    Giant cell arteritis ([facility] [address] 2/20/2023    Headache     Hearing loss     HTN (hypertension) 1/31/2013    Hyperglycemia 1/5/2018    IgG4 related disease ([facility] [address] [address] 2/20/2023    Premature ovarian failure 5/29/2014    Scalp lesion     Sleep apnea 01/31/2013    uses CPAP    TMJ dysfunction     Type 2 diabetes mellitus 1/20/2023       Past Surgical History:        Procedure Laterality Date    CARPAL TUNNEL RELEASE Right     COLONOSCOPY      FINGER SURGERY Left     index    PRE-MALIGNANT / BENIGN SKIN LESION EXCISION N/A 1/21/2022    EXCISION OF SCALP LESION performed by Esperanza Aponte MD at [address]       Family History:  Family History   Problem Relation Age of Onset    Other Mother Lewy Body disease    Stroke Father     Diabetes Father     Heart Disease Father        Social History:  Social History     Socioeconomic History    Marital status:      Spouse name: None    Number of children: None    Years of education: None    Highest education level: None   Tobacco Use    Smoking status: Never    Smokeless tobacco: Never   Vaping Use    Vaping Use: Never used   Substance and Sexual Activity    Alcohol use: Yes     Alcohol/week: 2.0 standard drinks     Types: 1 Standard drinks or equivalent, 1 Glasses of wine per week    Drug use: Never   Social History Narrative    ** Merged History Encounter **          Social Determinants of Health     Financial Resource Strain: Low Risk     Difficulty of Paying Living Expenses: Not hard at all   Food Insecurity: No Food Insecurity    Worried About [address] [place] [address] in the Last Year: Never true    Ran Out of Food in the Last Year: Never true   Transportation Needs: Unknown    Lack of Transportation (Non-Medical): No   Physical Activity: Sufficiently Active    Days of Exercise per Week: 5 days    Minutes of Exercise per Session: 80 min   Housing Stability: Unknown    Unstable Housing in the Last Year: No         Allergies:  Bee venom, Food, Pcn [penicillins], and Penicillins    Current Medications:    Prior to Admission medications    Medication Sig Start Date End Date Taking?  Authorizing Provider   nystatin (MYCOSTATIN) 545262 UNIT/ML suspension Take 5 mLs by mouth 4 times daily 2/20/23  Yes Niyah Ewing MD   metFORMIN (GLUCOPHAGE) 500 MG tablet Take 1 tablet by mouth daily (with breakfast) 1/31/23  Yes Niyah Ewing MD   SYMBICORT 80-4.5 MCG/ACT AERO Inhale 2 puffs into the lungs 2 times daily 1/20/23  Yes Niyah Ewing MD   sulfamethoxazole-trimethoprim (BACTRIM DS;SEPTRA DS) 800-160 MG per tablet Take 1 tablet by mouth daily   Yes Historical Provider, MD   predniSONE (DELTASONE) 20 MG tablet Take 60 mg by mouth daily   Yes Historical Provider, MD chengzole-betamethasone (LOTRISONE) 1-0.05 % lotion Apply topically 2 times daily. 1/20/23  Yes Ludwin Selby MD   insulin regular (HUMULIN R;NOVOLIN R) 100 UNIT/ML injection Inject 5 Units into the skin See Admin Instructions 1/20/23  Yes Ludwin Selby MD   insulin regular (NOVOLIN R) 100 UNIT/ML injection Inject 5 Units into the skin every 6 hours as needed for High Blood Sugar (if blood sugar is above 200) 1/11/23  Yes Ludwin Selby MD   losartan-hydroCHLOROthiazide [facility]) 50-12.5 MG per tablet TAKE 1 TABLET BY MOUTH EVERY DAY 10/6/22  Yes Karthik Nieves MD   escitalopram (LEXAPRO) 10 MG tablet 1 po daily 9/8/22  Yes Ludwin Selby MD   vitamin D (CHOLECALCIFEROL) 1000 UNIT TABS tablet Take 1,000 Units by mouth daily   Yes Historical Provider, MD   calcium carbonate (OSCAL) 500 MG TABS tablet Take 500 mg by mouth daily   Yes Historical Provider, MD   ascorbic acid (VITAMIN C) 500 MG tablet Take 1,000 mg by mouth daily   Yes Historical Provider, MD       Physical Exam:  Vital Signs: BP (!) 155/84   Pulse 76   Ht 5' 2\" (1.575 m)   Wt 174 lb (78.9 kg)   BMI 31.83 kg/m²   General: Patient appears  non-toxic  HENT: Atraumatic, normocephalic, oral mucosa moist no thrush noted  Lungs:  Clear bilaterally  Heart: Regular rate and rhythm  Abdomen: Non-distended, soft, non-tender  Extremities: No edema  Neuro: Nonfocal    Medical Decision Making and Plan:  Pertinent Labs & Imaging studies reviewed. (See chart for details)  Labs are pending    1. Giant cell arteritis (HCC)  Slow taper of steroids as per ophthalmology  - CBC with Auto Differential; Future  - Comprehensive Metabolic Panel; Future  - Hemoglobin A1C; Future    2. IgG4 related disease [facility])  To see pulmonologist in [place] for evaluation    3. Type 2 diabetes mellitus with hyperglycemia, without long-term current use of insulin (HCC)  Check above labs this is thought to be secondary to steroid use    4.  Oral thrush  Nystatin swish and swallow  - CBC with Auto Differential; Future  - Comprehensive Metabolic Panel;  Future  - Hemoglobin A1C; Future

## 2023-02-21 LAB
ESTIMATED AVERAGE GLUCOSE: 128.4 MG/DL
HBA1C MFR BLD: 6.1 %

## 2023-02-24 ENCOUNTER — TELEPHONE (OUTPATIENT)
Dept: INTERNAL MEDICINE CLINIC | Age: 68
End: 2023-02-24

## 2023-02-24 NOTE — TELEPHONE ENCOUNTER
----- Message from Christiano Graham sent at 2/24/2023  8:20 AM EST -----  Subject: Medication Problem    Medication: nystatin (MYCOSTATIN) 219449 UNIT/ML suspension  Dosage: Take 5 mLs by mouth 4 times daily  Ordering Provider: alyse    Question/Problem: Patient is wanting to know if she is supposed to take   the medication for a month then stop or continue to take 2 other refills   she got. Wondering when she is supposed to stop medication.       Pharmacy: [facility] [id number] - [address] [facility], [facility] [address] [phone]    ---------------------------------------------------------------------------  --------------  Gordo FISHER  [phone]; OK to leave message on voicemail  ---------------------------------------------------------------------------  --------------    SCRIPT ANSWERS  Relationship to Patient: Self

## 2023-03-21 ENCOUNTER — TELEPHONE (OUTPATIENT)
Dept: INTERNAL MEDICINE CLINIC | Age: 68
End: 2023-03-21

## 2023-03-21 NOTE — TELEPHONE ENCOUNTER
[facility] called to talk to Dr. Lizz Rojas about pt and her treatment after being seen there. PT saw Dr. Miles Foley yesterday and they recommended that she have reclast infusions. They wanted to know if this would be okay considering pt's osteopenia and steroid exposure. They also wanted to know if Dr. Lizz Rojas was comfortable managing this. Please call and advise.

## 2023-03-22 ENCOUNTER — TELEPHONE (OUTPATIENT)
Dept: INTERNAL MEDICINE CLINIC | Age: 68
End: 2023-03-22

## 2023-03-22 NOTE — TELEPHONE ENCOUNTER
Dr. Devon Jack is requesting an order for the following:    Reclast infusion due to her steroid exposure and osteopenia. Please call and advise when the order has be placed. She is also asking where she can get the pathology slide from a lung biopsy done late Dec of 2022, or early Jan 2023. She is going to send over a request for these and office notes. Please watch for the fax.

## 2023-03-22 NOTE — TELEPHONE ENCOUNTER
[place] consult follow up with Dr Abhijeet Montelongo  (Newest Message First)  Juana Ana \"Madeline\"  [facility] [address] (supporting Torsten Aguilar MD) 1 hour ago (1:55 PM)     [place]  Dear Dr. Abhijeet Montelongo,   Monday I met with Dr. Amelie Sprague of [facility]. He was concerned about the effect of steroid use on bone density. He suggested that you and I administer a one time IV to address the bone density concern at this time. Because I have an appointment scheduled on March 31, I wanted to alert you and hope that we can arrange this to be done in the next weeks. If you have questions or need Dr. Perdue Ear contact information, please let me know.    Daniel Parsons

## 2023-03-26 ENCOUNTER — TELEPHONE (OUTPATIENT)
Dept: ONCOLOGY | Age: 68
End: 2023-03-26

## 2023-03-26 DIAGNOSIS — Z79.52 LONG TERM CURRENT USE OF SYSTEMIC STEROIDS: ICD-10-CM

## 2023-03-26 NOTE — TELEPHONE ENCOUNTER
Signed orders received for 5 mg Relcast infusion. Orders placed, paper copy sent to pharmacy. Awaiting authorization for scheduling.     Electronically signed by Nuno Costello RN on 3/26/2023 at 12:55 PM

## 2023-03-31 ENCOUNTER — TELEPHONE (OUTPATIENT)
Dept: ENT CLINIC | Age: 68
End: 2023-03-31

## 2023-03-31 ENCOUNTER — OFFICE VISIT (OUTPATIENT)
Dept: INTERNAL MEDICINE CLINIC | Age: 68
End: 2023-03-31

## 2023-03-31 VITALS
TEMPERATURE: 97.7 F | DIASTOLIC BLOOD PRESSURE: 82 MMHG | SYSTOLIC BLOOD PRESSURE: 144 MMHG | OXYGEN SATURATION: 98 % | WEIGHT: 182 LBS | BODY MASS INDEX: 33.29 KG/M2 | HEART RATE: 82 BPM

## 2023-03-31 DIAGNOSIS — E11.9 TYPE 2 DIABETES MELLITUS WITHOUT COMPLICATION, WITHOUT LONG-TERM CURRENT USE OF INSULIN (HCC): Primary | ICD-10-CM

## 2023-03-31 DIAGNOSIS — D89.89 IGG4 RELATED DISEASE (HCC): ICD-10-CM

## 2023-03-31 NOTE — TELEPHONE ENCOUNTER
Patient is calling to see if Dr Nate Guardado  is calling about  her visit to [address] [place] [address] Dr Severiano Dinning ?  Also she is not sure if Dr Nate Guardado wants to see her ,please have Dr Nate Guardado call she is open to letting him Know the findings in  [place]   thank you

## 2023-03-31 NOTE — PROGRESS NOTES
disease    Stroke Father     Diabetes Father     Heart Disease Father        Social History:  Social History     Socioeconomic History    Marital status:    Tobacco Use    Smoking status: Never    Smokeless tobacco: Never   Vaping Use    Vaping Use: Never used   Substance and Sexual Activity    Alcohol use: Yes     Alcohol/week: 2.0 standard drinks     Types: 1 Standard drinks or equivalent, 1 Glasses of wine per week    Drug use: Never   Social History Narrative    ** Merged History Encounter **          Social Determinants of Health     Financial Resource Strain: Low Risk     Difficulty of Paying Living Expenses: Not hard at all   Food Insecurity: No Food Insecurity    Worried About [facility] in the Last Year: Never true    Ran Out of Food in the Last Year: Never true   Transportation Needs: Unknown    Lack of Transportation (Non-Medical): No   Physical Activity: Sufficiently Active    Days of Exercise per Week: 5 days    Minutes of Exercise per Session: 80 min   Housing Stability: Unknown    Unstable Housing in the Last Year: No         Allergies:  Bee venom, Food, Pcn [penicillins], and Penicillins    Current Medications:    Prior to Admission medications    Medication Sig Start Date End Date Taking? Authorizing Provider   nystatin (MYCOSTATIN) 607783 UNIT/ML suspension Take 5 mLs by mouth 4 times daily 2/20/23   Mann Stearns MD   metFORMIN (GLUCOPHAGE) 500 MG tablet Take 1 tablet by mouth daily (with breakfast) 1/31/23   Mann Stearns MD   SYMBICORT 80-4.5 MCG/ACT AERO Inhale 2 puffs into the lungs 2 times daily 1/20/23   Mann Stearns MD   sulfamethoxazole-trimethoprim (BACTRIM DS;SEPTRA DS) 800-160 MG per tablet Take 1 tablet by mouth daily    Historical Provider, MD   predniSONE (DELTASONE) 20 MG tablet Take 60 mg by mouth daily    Historical Provider, MD   clotrimazole-betamethasone (LOTRISONE) 1-0.05 % lotion Apply topically 2 times daily.  1/20/23   Mann Stearns MD   insulin regular (HUMULIN

## 2023-05-05 ENCOUNTER — TELEPHONE (OUTPATIENT)
Dept: INTERNAL MEDICINE CLINIC | Age: 68
End: 2023-05-05

## 2023-05-05 DIAGNOSIS — Z01.84 IMMUNITY STATUS TESTING: Primary | ICD-10-CM

## 2023-05-05 DIAGNOSIS — Z11.59 ENCOUNTER FOR SCREENING FOR OTHER VIRAL DISEASES: ICD-10-CM

## 2023-05-05 NOTE — TELEPHONE ENCOUNTER
Patient is requesting the following labs be ordered she is seeing a new rheumatologist and needs these labs to check immunity before she has some type of infusion from previous exposure while on a cruise     Hep B surface antibody   Hep B surface antigen   Surface care antibody   Tspot     Pt will have done Monday before appointment

## 2023-05-08 ENCOUNTER — OFFICE VISIT (OUTPATIENT)
Dept: INTERNAL MEDICINE CLINIC | Age: 68
End: 2023-05-08

## 2023-05-08 VITALS
DIASTOLIC BLOOD PRESSURE: 79 MMHG | HEART RATE: 74 BPM | HEIGHT: 62 IN | WEIGHT: 187 LBS | SYSTOLIC BLOOD PRESSURE: 144 MMHG | BODY MASS INDEX: 34.41 KG/M2

## 2023-05-08 DIAGNOSIS — Z11.59 ENCOUNTER FOR SCREENING FOR OTHER VIRAL DISEASES: ICD-10-CM

## 2023-05-08 DIAGNOSIS — Z11.1 SCREENING FOR TUBERCULOSIS: ICD-10-CM

## 2023-05-08 DIAGNOSIS — E11.9 TYPE 2 DIABETES MELLITUS WITHOUT COMPLICATION, WITHOUT LONG-TERM CURRENT USE OF INSULIN (HCC): ICD-10-CM

## 2023-05-08 DIAGNOSIS — Z01.84 IMMUNITY STATUS TESTING: ICD-10-CM

## 2023-05-08 DIAGNOSIS — Z00.00 PE (PHYSICAL EXAM), ANNUAL: ICD-10-CM

## 2023-05-08 DIAGNOSIS — E11.65 TYPE 2 DIABETES MELLITUS WITH HYPERGLYCEMIA, WITHOUT LONG-TERM CURRENT USE OF INSULIN (HCC): Primary | ICD-10-CM

## 2023-05-08 DIAGNOSIS — R73.9 ELEVATED BLOOD SUGAR: ICD-10-CM

## 2023-05-08 DIAGNOSIS — M31.30 GRANULOMATOSIS WITH POLYANGIITIS WITH PULMONARY INVOLVEMENT (HCC): ICD-10-CM

## 2023-05-08 DIAGNOSIS — D89.89 IGG4 RELATED DISEASE (HCC): ICD-10-CM

## 2023-05-08 DIAGNOSIS — I10 ESSENTIAL HYPERTENSION: ICD-10-CM

## 2023-05-08 PROBLEM — M30.1: Status: ACTIVE | Noted: 2023-05-08

## 2023-05-08 PROBLEM — M30.1: Status: RESOLVED | Noted: 2023-05-08 | Resolved: 2023-05-08

## 2023-05-08 PROBLEM — M90.80: Status: ACTIVE | Noted: 2023-05-08

## 2023-05-08 PROBLEM — M31.6 GIANT CELL ARTERITIS (HCC): Status: RESOLVED | Noted: 2023-02-20 | Resolved: 2023-05-08

## 2023-05-08 PROBLEM — D72.18: Status: ACTIVE | Noted: 2023-05-08

## 2023-05-08 PROBLEM — M90.80: Status: RESOLVED | Noted: 2023-05-08 | Resolved: 2023-05-08

## 2023-05-08 PROBLEM — J44.9 CHRONIC OBSTRUCTIVE PULMONARY DISEASE, UNSPECIFIED (HCC): Status: RESOLVED | Noted: 2022-12-05 | Resolved: 2023-05-08

## 2023-05-08 PROBLEM — D72.18: Status: RESOLVED | Noted: 2023-05-08 | Resolved: 2023-05-08

## 2023-05-08 PROBLEM — A50.02: Status: RESOLVED | Noted: 2023-05-08 | Resolved: 2023-05-08

## 2023-05-08 PROBLEM — D89.84 IGG4 RELATED DISEASE: Status: RESOLVED | Noted: 2023-02-20 | Resolved: 2023-05-08

## 2023-05-08 PROBLEM — A50.02: Status: ACTIVE | Noted: 2023-05-08

## 2023-05-08 NOTE — PROGRESS NOTES
HPI: Patient presented with follow-up of diabetes and hypertension her blood sugars have been running around 120 she is now on 30 mg of prednisone she is diagnosed as having Wegener's granulomatosis rather than IgG4 disease or giant cell arteritis she has been evaluated by her physician in [place] for Rituxan    Review of Systems: Otherwise negative    Vital Signs: BP (!) 144/79   Pulse 74   Ht 5' 2\" (1.575 m)   Wt 187 lb (84.8 kg) Comment: patient reported  BMI 34.20 kg/m²   General: Patient appears  non-toxic  HENT: Atraumatic, normocephalic, oral mucosa moist  Lungs:  Clear bilaterally  Heart: Regular rate and rhythm  Abdomen: Non-distended, soft, non-tender  Extremities: No edema  Neuro: Nonfocal    Medical Decision Making and Plan:  Pertinent Labs & Imaging studies reviewed. (See chart for details)  Blood studies are pending  Diabetes clinically under control with tapering of prednisone  Wegener's granulomatosis followed by rheumatology  Essential hypertension  stable control for now    The primary encounter diagnosis was Type 2 diabetes mellitus with hyperglycemia, without long-term current use of insulin ([address] Diagnoses of Essential hypertension, Matteo's osteochondritis, and Granulomatosis with polyangiitis with pulmonary involvement ([address] were also pertinent to this visit.

## 2023-05-09 LAB
25(OH)D3 SERPL-MCNC: 35.8 NG/ML
ALBUMIN SERPL-MCNC: 4.5 G/DL (ref 3.4–5)
ALBUMIN/GLOB SERPL: 2.5 {RATIO} (ref 1.1–2.2)
ALP SERPL-CCNC: 49 U/L (ref 40–129)
ALT SERPL-CCNC: 25 U/L (ref 10–40)
ANION GAP SERPL CALCULATED.3IONS-SCNC: 13 MMOL/L (ref 3–16)
AST SERPL-CCNC: 32 U/L (ref 15–37)
BILIRUB SERPL-MCNC: <0.2 MG/DL (ref 0–1)
BUN SERPL-MCNC: 22 MG/DL (ref 7–20)
CALCIUM SERPL-MCNC: 9.9 MG/DL (ref 8.3–10.6)
CHLORIDE SERPL-SCNC: 99 MMOL/L (ref 99–110)
CO2 SERPL-SCNC: 26 MMOL/L (ref 21–32)
CREAT SERPL-MCNC: 1.1 MG/DL (ref 0.6–1.2)
EST. AVERAGE GLUCOSE BLD GHB EST-MCNC: 137 MG/DL
GFR SERPLBLD CREATININE-BSD FMLA CKD-EPI: 55 ML/MIN/{1.73_M2}
GLUCOSE SERPL-MCNC: 129 MG/DL (ref 70–99)
HBA1C MFR BLD: 6.4 %
HBV SURFACE AB SERPL IA-ACNC: <3.5 MIU/ML
HBV SURFACE AG SERPL QL IA: NORMAL
POTASSIUM SERPL-SCNC: 4.7 MMOL/L (ref 3.5–5.1)
PROT SERPL-MCNC: 6.3 G/DL (ref 6.4–8.2)
SODIUM SERPL-SCNC: 138 MMOL/L (ref 136–145)

## 2023-05-11 LAB
GAMMA INTERFERON BACKGROUND BLD IA-ACNC: 0.02 IU/ML
MITOGEN IGNF BCKGRD COR BLD-ACNC: 8.47 IU/ML
QUANTI TB GOLD PLUS: NEGATIVE
QUANTI TB1 MINUS NIL: 0 IU/ML (ref 0–0.34)
QUANTI TB2 MINUS NIL: 0 IU/ML (ref 0–0.34)

## 2023-06-29 RX ORDER — ESCITALOPRAM OXALATE 10 MG/1
TABLET ORAL
Qty: 90 TABLET | Refills: 0 | Status: SHIPPED | OUTPATIENT
Start: 2023-06-29

## 2023-06-29 RX ORDER — LOSARTAN POTASSIUM AND HYDROCHLOROTHIAZIDE 12.5; 5 MG/1; MG/1
TABLET ORAL
Qty: 90 TABLET | Refills: 0 | Status: SHIPPED | OUTPATIENT
Start: 2023-06-29

## 2023-07-05 RX ORDER — DILTIAZEM HYDROCHLORIDE 60 MG/1
TABLET, FILM COATED ORAL
Qty: 10.2 G | Refills: 3 | Status: SHIPPED | OUTPATIENT
Start: 2023-07-05

## 2023-09-27 RX ORDER — BLOOD SUGAR DIAGNOSTIC
STRIP MISCELLANEOUS
Qty: 100 STRIP | Refills: 1 | Status: SHIPPED | OUTPATIENT
Start: 2023-09-27

## 2023-12-04 RX ORDER — DILTIAZEM HYDROCHLORIDE 60 MG/1
TABLET, FILM COATED ORAL
Qty: 10.2 EACH | Refills: 2 | Status: SHIPPED | OUTPATIENT
Start: 2023-12-04

## 2024-03-04 RX ORDER — DILTIAZEM HYDROCHLORIDE 60 MG/1
2 TABLET, FILM COATED ORAL 2 TIMES DAILY
Qty: 30.6 EACH | Refills: 3 | Status: SHIPPED | OUTPATIENT
Start: 2024-03-04

## 2024-05-28 ENCOUNTER — TELEPHONE (OUTPATIENT)
Dept: ONCOLOGY | Age: 69
End: 2024-05-28

## 2024-05-28 NOTE — TELEPHONE ENCOUNTER
Patient is due for reclast infusion. Please fax orders to [phone] if you would like us to get her scheduled. Or Call [phone] for an order form.

## (undated) DEVICE — HEAD AND NECK PACK: Brand: CONVERTORS

## (undated) DEVICE — GLOVE SURG SZ 65 L12IN FNGR THK94MIL STD WHT LTX FREE

## (undated) DEVICE — SOLUTION IV IRRIG 500ML 0.9% SODIUM CHL 2F7123

## (undated) DEVICE — MEDI-VAC NON-CONDUCTIVE SUCTION TUBING: Brand: CARDINAL HEALTH

## (undated) DEVICE — SYRINGE MED 10ML LUERLOCK TIP W/O SFTY DISP

## (undated) DEVICE — BLADE CLIPPER GEN PURP NS

## (undated) DEVICE — SUTURE NONABSORBABLE MONOFILAMENT 4-0 PS-2 18 IN BLU PROLENE 8682H

## (undated) DEVICE — GLOVE SURG SZ 65 THK91MIL LTX FREE SYN POLYISOPRENE

## (undated) DEVICE — INTENDED USE FOR SURGICAL MARKING ON INTACT SKIN, ALSO PROVIDES A PERMANENT METHOD OF IDENTIFYING OBJECTS IN THE OPERATING ROOM: Brand: WRITESITE® PLUS MINI PREP RESISTANT MARKER

## (undated) DEVICE — GAUZE,SPONGE,4"X4",16PLY,STRL,LF,10/TRAY: Brand: MEDLINE

## (undated) DEVICE — GLOVE SURG SZ 85 L12IN FNGR THK94MIL STD WHT LTX FREE

## (undated) DEVICE — NEEDLE HYPO 30GA L0.5IN BGE POLYPR HUB S STL REG BVL STR

## (undated) DEVICE — SUTURE MCRYL SZ 3-0 L27IN ABSRB UD L19MM PS-2 3/8 CIR PRIM Y427H

## (undated) DEVICE — GLOVE SURG SZ 75 L12IN FNGR THK94MIL STD WHT LTX FREE

## (undated) DEVICE — MINOR SET UP PK

## (undated) DEVICE — 10FR FRAZIER SUCTION HANDLE: Brand: CARDINAL HEALTH

## (undated) DEVICE — ELECTRODE ELECSURG NDL 2.8 INX7.2 CM COAT INSUL EDGE

## (undated) DEVICE — PENCIL ES L3M BTTN SWCH S STL HEX LOK BLDE ELECTRD HOLSTER

## (undated) DEVICE — SUTURE PROL SZ 5-0 L18IN NONABSORBABLE BLU L13MM P-3 3/8 8698G

## (undated) DEVICE — SUTURE VCRL SZ 4-0 L18IN ABSRB UD L13MM P-3 3/8 CIR PRIM J494H